# Patient Record
Sex: FEMALE | Race: WHITE | Employment: OTHER | URBAN - METROPOLITAN AREA
[De-identification: names, ages, dates, MRNs, and addresses within clinical notes are randomized per-mention and may not be internally consistent; named-entity substitution may affect disease eponyms.]

---

## 2017-03-22 ENCOUNTER — HOSPITAL ENCOUNTER (OUTPATIENT)
Facility: HOSPITAL | Age: 77
Setting detail: OBSERVATION
Discharge: HOME | End: 2017-03-23
Attending: EMERGENCY MEDICINE | Admitting: HOSPITALIST
Payer: MEDICARE

## 2017-03-22 ENCOUNTER — APPOINTMENT (OUTPATIENT)
Dept: GENERAL RADIOLOGY | Facility: HOSPITAL | Age: 77
End: 2017-03-22
Attending: EMERGENCY MEDICINE
Payer: MEDICARE

## 2017-03-22 DIAGNOSIS — R07.9 ACUTE CHEST PAIN: Primary | ICD-10-CM

## 2017-03-22 LAB
ANION GAP SERPL CALC-SCNC: 10 MMOL/L (ref 0–18)
BASOPHILS # BLD: 0.1 K/UL (ref 0–0.2)
BASOPHILS NFR BLD: 1 %
BNP SERPL-MCNC: 180 PG/ML (ref 0–100)
BUN SERPL-MCNC: 18 MG/DL (ref 8–20)
BUN/CREAT SERPL: 21.4 (ref 10–20)
CALCIUM SERPL-MCNC: 8.9 MG/DL (ref 8.5–10.5)
CHLORIDE SERPL-SCNC: 103 MMOL/L (ref 95–110)
CO2 SERPL-SCNC: 28 MMOL/L (ref 22–32)
CREAT SERPL-MCNC: 0.84 MG/DL (ref 0.5–1.5)
D DIMER PPP FEU-MCNC: 0.29 MCG/ML (ref ?–0.76)
EOSINOPHIL # BLD: 0.1 K/UL (ref 0–0.7)
EOSINOPHIL NFR BLD: 3 %
ERYTHROCYTE [DISTWIDTH] IN BLOOD BY AUTOMATED COUNT: 13.3 % (ref 11–15)
GLUCOSE SERPL-MCNC: 116 MG/DL (ref 70–99)
HCT VFR BLD AUTO: 36.7 % (ref 35–48)
HGB BLD-MCNC: 12.4 G/DL (ref 12–16)
INR BLD: 1.7 (ref 0.9–1.2)
LYMPHOCYTES # BLD: 1.4 K/UL (ref 1–4)
LYMPHOCYTES NFR BLD: 34 %
MCH RBC QN AUTO: 30.3 PG (ref 27–32)
MCHC RBC AUTO-ENTMCNC: 33.7 G/DL (ref 32–37)
MCV RBC AUTO: 89.9 FL (ref 80–100)
MONOCYTES # BLD: 0.6 K/UL (ref 0–1)
MONOCYTES NFR BLD: 14 %
NEUTROPHILS # BLD AUTO: 2 K/UL (ref 1.8–7.7)
NEUTROPHILS NFR BLD: 47 %
OSMOLALITY UR CALC.SUM OF ELEC: 295 MOSM/KG (ref 275–295)
PLATELET # BLD AUTO: 210 K/UL (ref 140–400)
PMV BLD AUTO: 7.7 FL (ref 7.4–10.3)
POTASSIUM SERPL-SCNC: 3.8 MMOL/L (ref 3.3–5.1)
PROTHROMBIN TIME: 19.3 SECONDS (ref 11.8–14.5)
RBC # BLD AUTO: 4.08 M/UL (ref 3.7–5.4)
SODIUM SERPL-SCNC: 141 MMOL/L (ref 136–144)
TROPONIN I SERPL-MCNC: 0.01 NG/ML (ref ?–0.03)
TROPONIN I SERPL-MCNC: 0.02 NG/ML (ref ?–0.03)
TROPONIN I SERPL-MCNC: 0.03 NG/ML (ref ?–0.03)
WBC # BLD AUTO: 4.2 K/UL (ref 4–11)

## 2017-03-22 PROCEDURE — 99220 INITIAL OBSERVATION CARE,LEVL III: CPT | Performed by: HOSPITALIST

## 2017-03-22 PROCEDURE — 71020 XR CHEST PA + LAT CHEST (CPT=71020): CPT

## 2017-03-22 RX ORDER — WARFARIN SODIUM 5 MG/1
5 TABLET ORAL NIGHTLY
Status: DISCONTINUED | OUTPATIENT
Start: 2017-03-22 | End: 2017-03-23

## 2017-03-22 RX ORDER — ATENOLOL 50 MG/1
50 TABLET ORAL DAILY
Status: ON HOLD | COMMUNITY
Start: 2017-03-23 | End: 2018-07-31

## 2017-03-22 RX ORDER — CARBAMAZEPINE 200 MG/1
100 TABLET ORAL 2 TIMES DAILY
COMMUNITY

## 2017-03-22 RX ORDER — LORAZEPAM 1 MG/1
1 TABLET ORAL 2 TIMES DAILY
Status: ON HOLD | COMMUNITY
End: 2018-08-03

## 2017-03-22 RX ORDER — RANITIDINE 15 MG/ML
150 SOLUTION ORAL DAILY
Status: DISCONTINUED | OUTPATIENT
Start: 2017-03-22 | End: 2017-03-23

## 2017-03-22 RX ORDER — SIMVASTATIN 5 MG
40 TABLET ORAL NIGHTLY
Status: ON HOLD | COMMUNITY
End: 2018-07-31

## 2017-03-22 RX ORDER — POTASSIUM CHLORIDE 20 MEQ/1
40 TABLET, EXTENDED RELEASE ORAL ONCE
Status: COMPLETED | OUTPATIENT
Start: 2017-03-22 | End: 2017-03-22

## 2017-03-22 RX ORDER — LEVOTHYROXINE SODIUM 88 UG/1
88 TABLET ORAL
COMMUNITY
Start: 2017-03-23 | End: 2020-01-03

## 2017-03-22 RX ORDER — MECLIZINE HYDROCHLORIDE 25 MG/1
25 TABLET ORAL 2 TIMES DAILY PRN
Status: DISCONTINUED | OUTPATIENT
Start: 2017-03-22 | End: 2017-03-23

## 2017-03-22 RX ORDER — FUROSEMIDE 40 MG/1
40 TABLET ORAL 2 TIMES DAILY
Status: DISCONTINUED | OUTPATIENT
Start: 2017-03-22 | End: 2017-03-22

## 2017-03-22 RX ORDER — NYSTATIN 100000 U/G
OINTMENT TOPICAL 2 TIMES DAILY
Status: DISCONTINUED | OUTPATIENT
Start: 2017-03-22 | End: 2017-03-23

## 2017-03-22 RX ORDER — MONTELUKAST SODIUM 10 MG/1
10 TABLET ORAL NIGHTLY
COMMUNITY

## 2017-03-22 RX ORDER — MECLIZINE HYDROCHLORIDE 25 MG/1
25 TABLET ORAL 2 TIMES DAILY PRN
COMMUNITY
End: 2019-09-24

## 2017-03-22 RX ORDER — PRAVASTATIN SODIUM 10 MG
10 TABLET ORAL NIGHTLY
Status: DISCONTINUED | OUTPATIENT
Start: 2017-03-22 | End: 2017-03-23

## 2017-03-22 RX ORDER — FUROSEMIDE 40 MG/1
40 TABLET ORAL 2 TIMES DAILY
COMMUNITY
Start: 2017-03-22

## 2017-03-22 RX ORDER — FUROSEMIDE 10 MG/ML
20 INJECTION INTRAMUSCULAR; INTRAVENOUS ONCE
Status: COMPLETED | OUTPATIENT
Start: 2017-03-22 | End: 2017-03-22

## 2017-03-22 RX ORDER — CARBAMAZEPINE 200 MG/1
200 TABLET ORAL 2 TIMES DAILY
Status: DISCONTINUED | OUTPATIENT
Start: 2017-03-22 | End: 2017-03-23

## 2017-03-22 RX ORDER — NITROGLYCERIN 0.4 MG/1
0.4 TABLET SUBLINGUAL EVERY 5 MIN PRN
Status: DISCONTINUED | OUTPATIENT
Start: 2017-03-22 | End: 2017-03-23

## 2017-03-22 RX ORDER — ATENOLOL 50 MG/1
50 TABLET ORAL DAILY
Status: DISCONTINUED | OUTPATIENT
Start: 2017-03-22 | End: 2017-03-23

## 2017-03-22 RX ORDER — MONTELUKAST SODIUM 10 MG/1
10 TABLET ORAL NIGHTLY
Status: DISCONTINUED | OUTPATIENT
Start: 2017-03-22 | End: 2017-03-23

## 2017-03-22 RX ORDER — GABAPENTIN 300 MG/1
300 CAPSULE ORAL 3 TIMES DAILY
COMMUNITY

## 2017-03-22 RX ORDER — PHENYTOIN SODIUM 200 MG/1
200 CAPSULE, EXTENDED RELEASE ORAL 2 TIMES DAILY
Status: ON HOLD | COMMUNITY
End: 2018-08-03

## 2017-03-22 RX ORDER — RANITIDINE 15 MG/ML
150 SOLUTION ORAL 2 TIMES DAILY
Status: ON HOLD | COMMUNITY
End: 2018-08-03

## 2017-03-22 RX ORDER — SPIRONOLACTONE 25 MG/1
25 TABLET ORAL DAILY
COMMUNITY
Start: 2017-03-23

## 2017-03-22 RX ORDER — LEVOTHYROXINE SODIUM 88 UG/1
88 TABLET ORAL
Status: DISCONTINUED | OUTPATIENT
Start: 2017-03-22 | End: 2017-03-23

## 2017-03-22 RX ORDER — GABAPENTIN 300 MG/1
300 CAPSULE ORAL 3 TIMES DAILY
Status: DISCONTINUED | OUTPATIENT
Start: 2017-03-22 | End: 2017-03-23

## 2017-03-22 RX ORDER — WARFARIN SODIUM 5 MG/1
5 TABLET ORAL NIGHTLY
Status: ON HOLD | COMMUNITY
End: 2018-08-03

## 2017-03-22 RX ORDER — PHENYTOIN SODIUM 100 MG/1
400 CAPSULE, EXTENDED RELEASE ORAL 2 TIMES DAILY
Status: DISCONTINUED | OUTPATIENT
Start: 2017-03-22 | End: 2017-03-23

## 2017-03-22 RX ORDER — FUROSEMIDE 40 MG/1
40 TABLET ORAL
Status: DISCONTINUED | OUTPATIENT
Start: 2017-03-22 | End: 2017-03-23

## 2017-03-22 RX ORDER — SPIRONOLACTONE 25 MG/1
25 TABLET ORAL DAILY
Status: DISCONTINUED | OUTPATIENT
Start: 2017-03-22 | End: 2017-03-23

## 2017-03-22 RX ORDER — LORAZEPAM 1 MG/1
1 TABLET ORAL 2 TIMES DAILY
Status: DISCONTINUED | OUTPATIENT
Start: 2017-03-22 | End: 2017-03-23

## 2017-03-22 NOTE — ED PROVIDER NOTES
Patient Seen in: [facility] AND [facility] Emergency Department    History   Patient presents with:  Chest Pain Angina (cardiovascular)    Stated Complaint:     HPI    68year old female presenting with chest pain  Location: entire anterior chest .  Quality: tight 200 MG Oral Tab,  Take 200 mg by mouth 2 (two) times daily. No family history on file. Smoking Status: Never Smoker                        Review of Systems    Positive for stated complaint:   Other systems are as noted in HPI.   Constitutional a displays no seizure activity. Skin: Skin is intact. No petechiae noted. She is not diaphoretic. No cyanosis. No pallor. Psychiatric: She has a normal mood and affect. Her behavior is normal.   Nursing note and vitals reviewed.           ED Course     Nu DIFFERENTIAL WITH PLATELET    Narrative: The following orders were created for panel order CBC WITH DIFFERENTIAL WITH PLATELET.   Procedure                               Abnormality         Status                     --------- Given 3/22/17 0998)   Perflutren Lipid Microsphere (DEFINITY) 6.52 MG/ML injection 1.5 mL (1.5 mL Intravenous Given 3/23/17 9422)   regadenoson (LEXISCAN) 0.4 MG/5ML injection ( Intravenous Given 3/23/17 1000)   Sodium Chloride 0.9 % solution ( Intravenous Discussions or Sign-Outs: Admitting physician    Impression:  After consideration of the above differential and review and interpretation of the above emergency department workup along with clinical intuition, the patient is reasonably believed to have Acu

## 2017-03-22 NOTE — ED INITIAL ASSESSMENT (HPI)
Patient from home woke up \"not feeling good\", c/o chest pressure and feeling an increase in heart rate and SOB. Pt had pacemaker placed in October for irregular heart rhythm. Denies hx of stroke, blood clots or heart attack.  Pt took 4 baby aspirin at [facility]

## 2017-03-22 NOTE — PLAN OF CARE
CARDIOVASCULAR - ADULT    • Maintains optimal cardiac output and hemodynamic stability Progressing    • Absence of cardiac arrhythmias or at baseline    ON TELE. V-PACED.  DENIES CHEST PAIN AT THIS TIME. TOMORROW SCHEDULED 2D ECHO AND STRESS TEST Progressin

## 2017-03-23 ENCOUNTER — APPOINTMENT (OUTPATIENT)
Dept: CV DIAGNOSTICS | Facility: HOSPITAL | Age: 77
End: 2017-03-23
Attending: HOSPITALIST
Payer: MEDICARE

## 2017-03-23 ENCOUNTER — APPOINTMENT (OUTPATIENT)
Dept: NUCLEAR MEDICINE | Facility: HOSPITAL | Age: 77
End: 2017-03-23
Attending: HOSPITALIST
Payer: MEDICARE

## 2017-03-23 VITALS
DIASTOLIC BLOOD PRESSURE: 65 MMHG | TEMPERATURE: 98 F | SYSTOLIC BLOOD PRESSURE: 100 MMHG | BODY MASS INDEX: 41.64 KG/M2 | HEART RATE: 69 BPM | WEIGHT: 235 LBS | RESPIRATION RATE: 18 BRPM | OXYGEN SATURATION: 95 % | HEIGHT: 63 IN

## 2017-03-23 LAB
ALBUMIN SERPL BCP-MCNC: 3.1 G/DL (ref 3.5–4.8)
ALBUMIN/GLOB SERPL: 1 {RATIO} (ref 1–2)
ALP SERPL-CCNC: 71 U/L (ref 32–100)
ALT SERPL-CCNC: 17 U/L (ref 14–54)
ANION GAP SERPL CALC-SCNC: 7 MMOL/L (ref 0–18)
AST SERPL-CCNC: 23 U/L (ref 15–41)
BASOPHILS # BLD: 0 K/UL (ref 0–0.2)
BASOPHILS NFR BLD: 1 %
BILIRUB SERPL-MCNC: 0.9 MG/DL (ref 0.3–1.2)
BUN SERPL-MCNC: 20 MG/DL (ref 8–20)
BUN/CREAT SERPL: 22.2 (ref 10–20)
CALCIUM SERPL-MCNC: 8.8 MG/DL (ref 8.5–10.5)
CHLORIDE SERPL-SCNC: 105 MMOL/L (ref 95–110)
CHOLEST SERPL-MCNC: 217 MG/DL (ref 110–200)
CO2 SERPL-SCNC: 30 MMOL/L (ref 22–32)
CREAT SERPL-MCNC: 0.9 MG/DL (ref 0.5–1.5)
EOSINOPHIL # BLD: 0.1 K/UL (ref 0–0.7)
EOSINOPHIL NFR BLD: 3 %
ERYTHROCYTE [DISTWIDTH] IN BLOOD BY AUTOMATED COUNT: 13.1 % (ref 11–15)
GLOBULIN PLAS-MCNC: 3 G/DL (ref 2.5–3.7)
GLUCOSE SERPL-MCNC: 95 MG/DL (ref 70–99)
HCT VFR BLD AUTO: 36.4 % (ref 35–48)
HDLC SERPL-MCNC: 60 MG/DL
HGB BLD-MCNC: 12.2 G/DL (ref 12–16)
INR BLD: 1.7 (ref 0.9–1.2)
LDLC SERPL CALC-MCNC: 138 MG/DL (ref 0–99)
LYMPHOCYTES # BLD: 2 K/UL (ref 1–4)
LYMPHOCYTES NFR BLD: 44 %
MAGNESIUM SERPL-MCNC: 2.1 MG/DL (ref 1.8–2.5)
MCH RBC QN AUTO: 30.3 PG (ref 27–32)
MCHC RBC AUTO-ENTMCNC: 33.6 G/DL (ref 32–37)
MCV RBC AUTO: 90.2 FL (ref 80–100)
MONOCYTES # BLD: 0.6 K/UL (ref 0–1)
MONOCYTES NFR BLD: 12 %
NEUTROPHILS # BLD AUTO: 1.8 K/UL (ref 1.8–7.7)
NEUTROPHILS NFR BLD: 40 %
NONHDLC SERPL-MCNC: 157 MG/DL
OSMOLALITY UR CALC.SUM OF ELEC: 296 MOSM/KG (ref 275–295)
PLATELET # BLD AUTO: 206 K/UL (ref 140–400)
PMV BLD AUTO: 7.7 FL (ref 7.4–10.3)
POTASSIUM SERPL-SCNC: 4.2 MMOL/L (ref 3.3–5.1)
POTASSIUM SERPL-SCNC: 4.2 MMOL/L (ref 3.3–5.1)
PROT SERPL-MCNC: 6.1 G/DL (ref 5.9–8.4)
PROTHROMBIN TIME: 19.7 SECONDS (ref 11.8–14.5)
RBC # BLD AUTO: 4.03 M/UL (ref 3.7–5.4)
SODIUM SERPL-SCNC: 142 MMOL/L (ref 136–144)
TRIGL SERPL-MCNC: 93 MG/DL (ref 1–149)
TSH SERPL-ACNC: 1.22 UIU/ML (ref 0.34–5.6)
WBC # BLD AUTO: 4.6 K/UL (ref 4–11)

## 2017-03-23 PROCEDURE — 93306 TTE W/DOPPLER COMPLETE: CPT

## 2017-03-23 PROCEDURE — 93016 CV STRESS TEST SUPVJ ONLY: CPT | Performed by: RADIOLOGY

## 2017-03-23 PROCEDURE — 78452 HT MUSCLE IMAGE SPECT MULT: CPT

## 2017-03-23 PROCEDURE — 93018 CV STRESS TEST I&R ONLY: CPT | Performed by: RADIOLOGY

## 2017-03-23 PROCEDURE — 99217 OBSERVATION CARE DISCHARGE: CPT | Performed by: HOSPITALIST

## 2017-03-23 PROCEDURE — 93306 TTE W/DOPPLER COMPLETE: CPT | Performed by: INTERNAL MEDICINE

## 2017-03-23 PROCEDURE — 93017 CV STRESS TEST TRACING ONLY: CPT

## 2017-03-23 RX ORDER — 0.9 % SODIUM CHLORIDE 0.9 %
VIAL (ML) INJECTION
Status: COMPLETED
Start: 2017-03-23 | End: 2017-03-23

## 2017-03-23 NOTE — DISCHARGE SUMMARY
[facility]    Discharge Summary    Dusty Mancini Patient Status:  Observation    10/13/1940 MRN [id number]   Location [facility]5W Attending Samson Hendrickson MD   Hosp Day # 1 PCP Radha Marinelli     Date of Admission: 3/22/2017 coumadin  Full code       Complications: none     Consultants     Provider Role    Daniella Echevarria MD Consulting Physician           Pending Labs     Order Current Status    RAINBOW DRAW 1711 Donita Curtis (03/22/17 7975)          Discharge Plan:   Disc by mouth 2 (two) times daily. Refills:  0       gabapentin 300 MG Caps   Last time this was given:  300 mg on 3/23/2017 11:33 AM   Commonly known as:  NEURONTIN        Take 300 mg by mouth 3 (three) times daily.     Refills:  0       Levothyroxine Sodium [place] [id number]  [phone]            Follow up Labs and imaging: none         Time spent:  > 30 minutes    JOHN FISH  3/23/2017

## 2017-03-23 NOTE — PLAN OF CARE
Maintains optimal cardiac output and hemodynamic stability Progressing      Absence of cardiac arrhythmias or at baseline Progressing    CELESTINE FOR 2DECHO AND LEXISCAN.  IF RESULTS NORMAL PLAN FOR DISCHARGE HOME  Patient/Family Long Term Goal Progressing    TO

## 2018-07-31 ENCOUNTER — APPOINTMENT (OUTPATIENT)
Dept: CT IMAGING | Facility: HOSPITAL | Age: 78
DRG: 092 | End: 2018-07-31
Attending: EMERGENCY MEDICINE
Payer: MEDICARE

## 2018-07-31 ENCOUNTER — APPOINTMENT (OUTPATIENT)
Dept: CT IMAGING | Facility: HOSPITAL | Age: 78
DRG: 092 | End: 2018-07-31
Payer: MEDICARE

## 2018-07-31 ENCOUNTER — APPOINTMENT (OUTPATIENT)
Dept: MRI IMAGING | Facility: HOSPITAL | Age: 78
DRG: 092 | End: 2018-07-31
Attending: HOSPITALIST
Payer: MEDICARE

## 2018-07-31 ENCOUNTER — HOSPITAL ENCOUNTER (INPATIENT)
Facility: HOSPITAL | Age: 78
LOS: 3 days | Discharge: NURSING, REHAB OR LONG-TERM CARE | DRG: 092 | End: 2018-08-03
Attending: EMERGENCY MEDICINE | Admitting: HOSPITALIST
Payer: MEDICARE

## 2018-07-31 ENCOUNTER — APPOINTMENT (OUTPATIENT)
Dept: GENERAL RADIOLOGY | Facility: HOSPITAL | Age: 78
DRG: 092 | End: 2018-07-31
Payer: MEDICARE

## 2018-07-31 DIAGNOSIS — R40.4 TRANSIENT ALTERATION OF AWARENESS: ICD-10-CM

## 2018-07-31 DIAGNOSIS — R41.82 ALTERED MENTAL STATUS, UNSPECIFIED ALTERED MENTAL STATUS TYPE: Primary | ICD-10-CM

## 2018-07-31 DIAGNOSIS — R40.0 SOMNOLENCE: ICD-10-CM

## 2018-07-31 LAB
ANION GAP SERPL CALC-SCNC: 9 MMOL/L (ref 0–18)
APTT PPP: 38.1 SECONDS (ref 23.2–35.3)
BASE EXCESS BLD CALC-SCNC: 6.1 MMOL/L (ref ?–2)
BASOPHILS # BLD: 0 K/UL (ref 0–0.2)
BASOPHILS NFR BLD: 1 %
BILIRUB UR QL: NEGATIVE
BUN SERPL-MCNC: 10 MG/DL (ref 8–20)
BUN/CREAT SERPL: 11.8 (ref 10–20)
CALCIUM SERPL-MCNC: 8.6 MG/DL (ref 8.5–10.5)
CARBAMAZEPINE SERPL-MCNC: 8.2 MCG/ML (ref 4–12)
CHLORIDE SERPL-SCNC: 99 MMOL/L (ref 95–110)
CO2 SERPL-SCNC: 29 MMOL/L (ref 22–32)
COLOR UR: YELLOW
CREAT SERPL-MCNC: 0.85 MG/DL (ref 0.5–1.5)
DIGOXIN SERPL-MCNC: 0.8 NG/ML (ref 0.8–2.1)
EOSINOPHIL # BLD: 0.1 K/UL (ref 0–0.7)
EOSINOPHIL NFR BLD: 3 %
ERYTHROCYTE [DISTWIDTH] IN BLOOD BY AUTOMATED COUNT: 13.4 % (ref 11–15)
GLUCOSE BLDC GLUCOMTR-MCNC: 117 MG/DL (ref 70–99)
GLUCOSE SERPL-MCNC: 118 MG/DL (ref 70–99)
GLUCOSE UR-MCNC: NEGATIVE MG/DL
HCO3 BLDA-SCNC: 31.7 MEQ/L (ref 21–27)
HCT VFR BLD AUTO: 38.8 % (ref 35–48)
HGB BLD-MCNC: 12.8 G/DL (ref 12–16)
HGB UR QL STRIP.AUTO: NEGATIVE
INR BLD: 3.4 (ref 0.9–1.2)
KETONES UR-MCNC: NEGATIVE MG/DL
LEUKOCYTE ESTERASE UR QL STRIP.AUTO: NEGATIVE
LYMPHOCYTES # BLD: 1.5 K/UL (ref 1–4)
LYMPHOCYTES NFR BLD: 33 %
MCH RBC QN AUTO: 30.7 PG (ref 27–32)
MCHC RBC AUTO-ENTMCNC: 33 G/DL (ref 32–37)
MCV RBC AUTO: 92.8 FL (ref 80–100)
MODIFIED ALLEN TEST: POSITIVE
MONOCYTES # BLD: 0.7 K/UL (ref 0–1)
MONOCYTES NFR BLD: 15 %
NEUTROPHILS # BLD AUTO: 2.2 K/UL (ref 1.8–7.7)
NEUTROPHILS NFR BLD: 48 %
NITRITE UR QL STRIP.AUTO: NEGATIVE
O2 CT BLD-SCNC: 15.9 VOL% (ref 15–23)
OSMOLALITY UR CALC.SUM OF ELEC: 284 MOSM/KG (ref 275–295)
OXYGEN LITERS/MINUTE: 2 L/MIN
PCO2 BLDA: 52 MM HG (ref 35–45)
PH BLDA: 7.4 [PH] (ref 7.35–7.45)
PH UR: 6 [PH] (ref 5–8)
PHENYTOIN SERPL-MCNC: 19.4 MCG/ML (ref 10–20)
PLATELET # BLD AUTO: 213 K/UL (ref 140–400)
PMV BLD AUTO: 8.1 FL (ref 7.4–10.3)
PO2 BLDA: 67 MM HG (ref 80–100)
PO2 SATN ADJ TO 0.5 BLD: 26 MMHG (ref 24–28)
POTASSIUM SERPL-SCNC: 3.7 MMOL/L (ref 3.3–5.1)
PROT UR-MCNC: NEGATIVE MG/DL
PROTHROMBIN TIME: 33.3 SECONDS (ref 11.8–14.5)
PUNCTURE CHARGE: YES
RBC # BLD AUTO: 4.18 M/UL (ref 3.7–5.4)
SAO2 % BLDA: 93.5 % (ref 94–100)
SODIUM SERPL-SCNC: 137 MMOL/L (ref 136–144)
SP GR UR STRIP: 1.01 (ref 1–1.03)
TROPONIN I SERPL-MCNC: 0.01 NG/ML (ref ?–0.03)
UROBILINOGEN UR STRIP-ACNC: <2
VIT C UR-MCNC: NEGATIVE MG/DL
WBC # BLD AUTO: 4.5 K/UL (ref 4–11)

## 2018-07-31 PROCEDURE — 70496 CT ANGIOGRAPHY HEAD: CPT | Performed by: EMERGENCY MEDICINE

## 2018-07-31 PROCEDURE — 70450 CT HEAD/BRAIN W/O DYE: CPT | Performed by: EMERGENCY MEDICINE

## 2018-07-31 PROCEDURE — 99223 1ST HOSP IP/OBS HIGH 75: CPT | Performed by: HOSPITALIST

## 2018-07-31 PROCEDURE — 70498 CT ANGIOGRAPHY NECK: CPT | Performed by: EMERGENCY MEDICINE

## 2018-07-31 PROCEDURE — 71045 X-RAY EXAM CHEST 1 VIEW: CPT | Performed by: EMERGENCY MEDICINE

## 2018-07-31 RX ORDER — ACETAMINOPHEN 325 MG/1
650 TABLET ORAL EVERY 6 HOURS PRN
Status: DISCONTINUED | OUTPATIENT
Start: 2018-07-31 | End: 2018-08-03

## 2018-07-31 RX ORDER — DIGOXIN 125 MCG
125 TABLET ORAL DAILY
Status: DISCONTINUED | OUTPATIENT
Start: 2018-08-01 | End: 2018-08-03

## 2018-07-31 RX ORDER — SOTALOL HYDROCHLORIDE 80 MG/1
80 TABLET ORAL DAILY
Status: DISCONTINUED | OUTPATIENT
Start: 2018-08-01 | End: 2018-08-03

## 2018-07-31 RX ORDER — ATORVASTATIN CALCIUM 40 MG/1
40 TABLET, FILM COATED ORAL NIGHTLY
COMMUNITY

## 2018-07-31 RX ORDER — GABAPENTIN 300 MG/1
300 CAPSULE ORAL 3 TIMES DAILY
Status: DISCONTINUED | OUTPATIENT
Start: 2018-07-31 | End: 2018-08-03

## 2018-07-31 RX ORDER — SPIRONOLACTONE 25 MG/1
25 TABLET ORAL DAILY
Status: DISCONTINUED | OUTPATIENT
Start: 2018-08-01 | End: 2018-08-03

## 2018-07-31 RX ORDER — LORAZEPAM 1 MG/1
1 TABLET ORAL 2 TIMES DAILY
Status: DISCONTINUED | OUTPATIENT
Start: 2018-07-31 | End: 2018-08-03

## 2018-07-31 RX ORDER — NITROGLYCERIN 40 MG/1
1 PATCH TRANSDERMAL EVERY MORNING
Status: DISCONTINUED | OUTPATIENT
Start: 2018-08-01 | End: 2018-08-03

## 2018-07-31 RX ORDER — NITROGLYCERIN 40 MG/1
1 PATCH TRANSDERMAL DAILY
COMMUNITY
End: 2019-10-24

## 2018-07-31 RX ORDER — PHENYTOIN SODIUM 100 MG/1
200 CAPSULE, EXTENDED RELEASE ORAL 2 TIMES DAILY
Status: DISCONTINUED | OUTPATIENT
Start: 2018-07-31 | End: 2018-08-01

## 2018-07-31 RX ORDER — CARBAMAZEPINE 200 MG/1
200 TABLET ORAL 2 TIMES DAILY
Status: DISCONTINUED | OUTPATIENT
Start: 2018-07-31 | End: 2018-08-03

## 2018-07-31 RX ORDER — MONTELUKAST SODIUM 10 MG/1
10 TABLET ORAL NIGHTLY
Status: DISCONTINUED | OUTPATIENT
Start: 2018-07-31 | End: 2018-08-03

## 2018-07-31 RX ORDER — SODIUM CHLORIDE 0.9 % (FLUSH) 0.9 %
3 SYRINGE (ML) INJECTION AS NEEDED
Status: DISCONTINUED | OUTPATIENT
Start: 2018-07-31 | End: 2018-08-03

## 2018-07-31 RX ORDER — FAMOTIDINE 20 MG/1
20 TABLET ORAL 2 TIMES DAILY
Status: DISCONTINUED | OUTPATIENT
Start: 2018-07-31 | End: 2018-08-01

## 2018-07-31 RX ORDER — MECLIZINE HYDROCHLORIDE 25 MG/1
25 TABLET ORAL 2 TIMES DAILY PRN
Status: DISCONTINUED | OUTPATIENT
Start: 2018-07-31 | End: 2018-08-03

## 2018-07-31 RX ORDER — NITROGLYCERIN 40 MG/1
1 PATCH TRANSDERMAL DAILY
Status: DISCONTINUED | OUTPATIENT
Start: 2018-07-31 | End: 2018-07-31

## 2018-07-31 RX ORDER — ONDANSETRON 2 MG/ML
4 INJECTION INTRAMUSCULAR; INTRAVENOUS EVERY 6 HOURS PRN
Status: DISCONTINUED | OUTPATIENT
Start: 2018-07-31 | End: 2018-08-03

## 2018-07-31 RX ORDER — SOTALOL HYDROCHLORIDE 80 MG/1
80 TABLET ORAL DAILY
COMMUNITY

## 2018-07-31 RX ORDER — ATORVASTATIN CALCIUM 40 MG/1
40 TABLET, FILM COATED ORAL NIGHTLY
Status: DISCONTINUED | OUTPATIENT
Start: 2018-07-31 | End: 2018-08-03

## 2018-07-31 RX ORDER — DILTIAZEM HYDROCHLORIDE 120 MG/1
120 TABLET, FILM COATED ORAL DAILY
COMMUNITY

## 2018-07-31 RX ORDER — FUROSEMIDE 40 MG/1
40 TABLET ORAL 2 TIMES DAILY
Status: DISCONTINUED | OUTPATIENT
Start: 2018-07-31 | End: 2018-08-03

## 2018-07-31 RX ORDER — 0.9 % SODIUM CHLORIDE 0.9 %
VIAL (ML) INJECTION
Status: COMPLETED
Start: 2018-07-31 | End: 2018-07-31

## 2018-07-31 RX ORDER — LEVOTHYROXINE SODIUM 88 UG/1
88 TABLET ORAL
Status: DISCONTINUED | OUTPATIENT
Start: 2018-08-01 | End: 2018-08-03

## 2018-07-31 RX ORDER — ASPIRIN 325 MG
325 TABLET, DELAYED RELEASE (ENTERIC COATED) ORAL ONCE
Status: COMPLETED | OUTPATIENT
Start: 2018-07-31 | End: 2018-07-31

## 2018-07-31 RX ORDER — DIGOXIN 125 MCG
TABLET ORAL DAILY
COMMUNITY

## 2018-07-31 NOTE — RESPIRATORY THERAPY NOTE
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 Pt does not routinely use a CPAP device at home. pt use oxygen during night time instead of cpap. Claustrophobic to the mask.       Pt refused: yes

## 2018-07-31 NOTE — PROGRESS NOTES
07/31/18 1421   Clinical Encounter Type   Visited With Patient and family together   Routine Visit Introduction   Continue Visiting Yes   Crisis Visit ED   Scientology Encounters   Scientology Needs Prayer   Spiritual Requests During Visit / Hospitalization

## 2018-07-31 NOTE — H&P
[facility]    PATIENT'S NAME: Darren Henson PHYSICIAN: Edin Polanco MD   PATIENT ACCOUNT#:   [id number]    LOCATION:  [place] [address]  MEDICAL RECORD #:   [id number]       YOB: 1940  ADMISSION DATE:       07/31/2 Cardizem. ALLERGIES:  Lidocaine, sulfa, and Vicodin; the patient is not able to specify reactions. FAMILY HISTORY:  Both parents  of old age. The patient is not able to provide any further details.     SOCIAL HISTORY:  No tobacco, alcohol or drug I will discontinue the Cardizem for now and if needed will increase her sotalol dose. Neurology consult; Dr. Claus Painting was notified. We will place her on fall precautions, aspiration precautions. Physical and Occupational Therapy to evaluate the patient.

## 2018-07-31 NOTE — ED INITIAL ASSESSMENT (HPI)
PATIENT VIA MEDICS FROM HOME---PATIENT APPARENTLY SLIPPED OUT OF HER CHAIR ABOUT AN HOUR AGO WITH NO INJURY. PATIENT HAS HAD SLURRED SPEECH SINCE    ON COUMADIN.

## 2018-07-31 NOTE — PROGRESS NOTES
Neurology Inpatient Note    Attempt to see pt in ED, away for testing; will re attempt.     MD TAISHA Chauhan [facility]

## 2018-07-31 NOTE — ED NOTES
Care assumed from EMS presents from home with slurred speech and generalized weakness Daughter in Law reports 1 hour hx of slurred speech and unstable gait Pt drowsy arouses to verbal stim and states \"my tongue feel fuzzy\" No extremity weakness + R eye p

## 2018-07-31 NOTE — PROGRESS NOTES
Ranitidine is a non-formulary medication and will be therapeutically interchanged to Famotidine per P&T protocol

## 2018-07-31 NOTE — ED PROVIDER NOTES
Patient Seen in: [facility] Emergency Department    History   Patient presents with:  Stroke    Stated Complaint: STROKE    HPI    71-year-old female who normally follows reportedly at [facility] per EMS and the patient was at home getting intact and equal distal pulses. Pulmonary/Chest: Effort normal. No respiratory distress. Clear and equal breath sounds bilaterally. Abdominal: Soft. There is no tenderness. There is no guarding. Musculoskeletal: Normal range of motion.  No edema or t (TEGRETOL) TOTAL - Normal   CBC WITH DIFFERENTIAL WITH PLATELET    Narrative: The following orders were created for panel order CBC WITH DIFFERENTIAL WITH PLATELET.   Procedure                               Abnormality         Status FINDINGS:  CSF SPACES: The ventricles, cisterns, and sulci are dilated, but remain commensurate in caliber, consistent with parenchymal volume loss of a degree that is appropriate for age.  No hydrocephalus, subarachnoid hemorrhage, or effacement of the bas (CST): Mohini Mendez MD on 7/31/2018 at 12:36     Approved by (CST): Mohini Mendez MD on 7/31/2018 at 12:38          Ct Stroke Cta Brain/cta Neck (w Iv)(cpt=70496/66245)    Result Date: 7/31/2018  PROCEDURE: CT STROKE CTA BRAIN/CTA NECK (WIV) (CPT=7049 spine.  INTRACRANIAL ARTERIES: ANTERIOR CEREBRALS:  No significant stenosis. No visible aneurysm or vascular malformation. MIDDLE CEREBRALS: No significant stenosis. No visible aneurysm or vascular malformation.  POSTERIOR CEREBRALS: No significant steno nighttime. Spoke with Dr. Gareth Long who recommended a CTA. The patient will be admitted under the [facility] hospitalist to telemetry. Per daughter she does have an MRI compatible pacemaker device.   This will need to be confirmed through [employer]

## 2018-07-31 NOTE — PROGRESS NOTES
Patient has [address] pacemaker and company states device is  approved for 1.5 and 3 ranjeet MRI and system does need to be programmed to MRI safe mode, requiring representative from company or cardiologist to switch into this mode.  Representative is

## 2018-08-01 ENCOUNTER — APPOINTMENT (OUTPATIENT)
Dept: MRI IMAGING | Facility: HOSPITAL | Age: 78
DRG: 092 | End: 2018-08-01
Attending: HOSPITALIST
Payer: MEDICARE

## 2018-08-01 LAB
AMMONIA PLAS-MCNC: 98 UG/DL (ref 19.5–64.6)
ANION GAP SERPL CALC-SCNC: 10 MMOL/L (ref 0–18)
BASOPHILS # BLD: 0.1 K/UL (ref 0–0.2)
BASOPHILS NFR BLD: 1 %
BUN SERPL-MCNC: 8 MG/DL (ref 8–20)
BUN/CREAT SERPL: 9.4 (ref 10–20)
CALCIUM SERPL-MCNC: 8.8 MG/DL (ref 8.5–10.5)
CHLORIDE SERPL-SCNC: 95 MMOL/L (ref 95–110)
CO2 SERPL-SCNC: 31 MMOL/L (ref 22–32)
CREAT SERPL-MCNC: 0.85 MG/DL (ref 0.5–1.5)
EOSINOPHIL # BLD: 0.1 K/UL (ref 0–0.7)
EOSINOPHIL NFR BLD: 2 %
ERYTHROCYTE [DISTWIDTH] IN BLOOD BY AUTOMATED COUNT: 13.5 % (ref 11–15)
GLUCOSE SERPL-MCNC: 95 MG/DL (ref 70–99)
HCT VFR BLD AUTO: 37.1 % (ref 35–48)
HGB BLD-MCNC: 12.3 G/DL (ref 12–16)
INR BLD: 3 (ref 0.9–1.2)
LYMPHOCYTES # BLD: 1.5 K/UL (ref 1–4)
LYMPHOCYTES NFR BLD: 32 %
MCH RBC QN AUTO: 30.3 PG (ref 27–32)
MCHC RBC AUTO-ENTMCNC: 33.2 G/DL (ref 32–37)
MCV RBC AUTO: 91.4 FL (ref 80–100)
MONOCYTES # BLD: 0.7 K/UL (ref 0–1)
MONOCYTES NFR BLD: 15 %
NEUTROPHILS # BLD AUTO: 2.4 K/UL (ref 1.8–7.7)
NEUTROPHILS NFR BLD: 50 %
OSMOLALITY UR CALC.SUM OF ELEC: 280 MOSM/KG (ref 275–295)
PLATELET # BLD AUTO: 221 K/UL (ref 140–400)
PMV BLD AUTO: 8.2 FL (ref 7.4–10.3)
POTASSIUM SERPL-SCNC: 3.5 MMOL/L (ref 3.3–5.1)
POTASSIUM SERPL-SCNC: 4.6 MMOL/L (ref 3.3–5.1)
PROTHROMBIN TIME: 30.1 SECONDS (ref 11.8–14.5)
RBC # BLD AUTO: 4.06 M/UL (ref 3.7–5.4)
SODIUM SERPL-SCNC: 136 MMOL/L (ref 136–144)
WBC # BLD AUTO: 4.7 K/UL (ref 4–11)

## 2018-08-01 PROCEDURE — 99223 1ST HOSP IP/OBS HIGH 75: CPT | Performed by: OTHER

## 2018-08-01 PROCEDURE — 99233 SBSQ HOSP IP/OBS HIGH 50: CPT | Performed by: HOSPITALIST

## 2018-08-01 RX ORDER — POTASSIUM CHLORIDE 20 MEQ/1
40 TABLET, EXTENDED RELEASE ORAL EVERY 4 HOURS
Status: COMPLETED | OUTPATIENT
Start: 2018-08-01 | End: 2018-08-01

## 2018-08-01 RX ORDER — PHENYTOIN SODIUM 100 MG/1
200 CAPSULE, EXTENDED RELEASE ORAL EVERY EVENING
Status: DISCONTINUED | OUTPATIENT
Start: 2018-08-01 | End: 2018-08-03

## 2018-08-01 RX ORDER — FAMOTIDINE 20 MG/1
20 TABLET ORAL DAILY
Status: DISCONTINUED | OUTPATIENT
Start: 2018-08-02 | End: 2018-08-03

## 2018-08-01 RX ORDER — PHENYTOIN SODIUM 100 MG/1
100 CAPSULE, EXTENDED RELEASE ORAL EVERY MORNING
Status: DISCONTINUED | OUTPATIENT
Start: 2018-08-01 | End: 2018-08-03

## 2018-08-01 NOTE — PROGRESS NOTES
[facility]    Progress Note    Stefania Maldonado Patient Status:  Inpatient    10/13/1940 MRN [id number]   Location [facility] 3W/SW Attending Belén Rendon MD   Hosp Day # 1 PCP Medina Geronimo       Subjective:    MICHAELA TAN mg Oral BID   • digoxin  125 mcg Oral Daily   • furosemide  40 mg Oral BID   • gabapentin  300 mg Oral TID   • Levothyroxine Sodium  88 mcg Oral Before breakfast   • LORazepam  1 mg Oral BID   • Montelukast Sodium  10 mg Oral Nightly   • Sotalol HCl  80 mg Date: 7/31/2018  CONCLUSION:  1. No acute intracranial hemorrhage or evidence of extended large vessel infarction. 2. Senescent changes of parenchymal volume loss with sequela of chronic microvascular ischemic disease.  There is also large vessel atheroscl Results  Component Value Date   INR 3.0 (H) 08/01/2018   INR 3.4 (H) 07/31/2018   INR 1.7 (H) 03/23/2017             Greater than 35 minutes spent, >50% spent counseling re: treatment plan and workup  Sallie Estrella MD      **Certification      PHYSICIA

## 2018-08-01 NOTE — OCCUPATIONAL THERAPY NOTE
OCCUPATIONAL THERAPY EVALUATION - INPATIENT     Room Number: 192/469-S  Evaluation Date: 8/1/2018  Type of Evaluation: Initial  Presenting Problem: from home, slipped out of chair; slurred speech weakness    Physician Order: IP Consult to Occupational Ther baseline and would benefit from skilled inpatient OT to address the above deficits, maximizing patient's ability to return to prior level of function.  Pt does not demonstrate the physical or cognitive skills to return to previous living environment and wou OCCUPATIONAL THERAPY EXAMINATION      OBJECTIVE  Precautions: Bed/chair alarm  Fall Risk: High fall risk    PAIN ASSESSMENT  Ratin          ACTIVITY TOLERANCE  O2 Saturation: 93%  O2 Device: Nasal cannula  Liters of O2:  2  Heart Rate: 63       COG Sit : Other (Comment) (SBA)  Sit to Stand:  Other (Comment) (CGA)  Toilet Transfer: Min A  Chair Transfer: Min A  Bed mobility: SBA  Bedroom Mobility: Min A    BALANCE ASSESSMENT  Static Sitting: SBA  Dynamic Sitting: SBA  Static Standing: Min A   Dynamic S

## 2018-08-01 NOTE — PHYSICAL THERAPY NOTE
PHYSICAL THERAPY EVALUATION - INPATIENT     Room Number: 715/668-W  Evaluation Date: 8/1/2018  Type of Evaluation: Initial   Physician Order: PT Eval and Treat    Presenting Problem: AMS  Reason for Therapy: Mobility Dysfunction and Discharge Planning in a supervised setting;Sub-acute rehabilitation    PLAN  PT Treatment Plan: Bed mobility; Body mechanics; Patient education;Gait training;Stair training;Transfer training;Balance training;Strengthening  Rehab Potential : Good  Frequency (Obs): 5x/week Nasal cannula  Liters of O2:  2  Heart Rate: 63    AM-PAC '6-Clicks' INPATIENT SHORT FORM - BASIC MOBILITY  How much difficulty does the patient currently have. ..  -   Turning over in bed (including adjusting bedclothes, sheets and blankets)?: None   -   S supervision   Goal #3   Current Status    Goal #4 Patient will negotiate 6 stairs/one curb w/ assistive device and supervision   Goal #4   Current Status    Goal #5 Patient to demonstrate independence with home activity/exercise instructions provided to pa

## 2018-08-01 NOTE — PLAN OF CARE
Problem: Patient Centered Care  Goal: Patient preferences are identified and integrated in the patient's plan of care  Interventions:  - What would you like us to know as we care for you?  \"my daughter in law Titus Patel knows all my medication\"  - Provide time Encourage visually impaired, hearing impaired and aphasic patients to use assistive/communication devices  Outcome: Progressing

## 2018-08-01 NOTE — CONSULTS
Neurology Inpatient Consult Note    David Forte : 10/13/1940   Referring Physician: Dr. Eliane Lainez  HPI:     David Forte is a 68year old female who is being seen in neurologic evaluation.     Patient being seen in evaluation for fall, slurre no dysuria, no frequency  MUSCULOSKELETAL: see HPI  PSYCH: no depression, no anxiety  NEURO: see HPI    EXAM:     Vitals:  /48 (BP Location: Right arm)   Pulse 62   Temp 98.2 °F (36.8 °C) (Oral)   Resp 18   Ht 157.5 cm (5' 2\")   Wt 212 lb 12.8 oz (9 Conclusions    1. Left ventricle: The cavity size was normal. Wall thickness was normal.     Systolic function was mildly reduced. The estimated ejection fraction was     45-50%. Hypokinesis of the apicalseptal myocardium.  Hypokinesis of the     anterosept

## 2018-08-01 NOTE — PROGRESS NOTES
[facility] Pharmacy Note:  Renal Dose Adjustment    Jennifer Orozco has been prescribed famotidine (PEPCID) 20 mg orally every 12 hours. Estimated Creatinine Clearance: 43.8 mL/min (based on SCr of 0.85 mg/dL).     Her calculated creatinine clearance is <50 ml/

## 2018-08-02 LAB
ALBUMIN SERPL BCP-MCNC: 3 G/DL (ref 3.5–4.8)
ALP SERPL-CCNC: 95 U/L (ref 32–100)
ALT SERPL-CCNC: 15 U/L (ref 14–54)
AMMONIA PLAS-MCNC: 58 UG/DL (ref 19.5–64.6)
ANION GAP SERPL CALC-SCNC: 10 MMOL/L (ref 0–18)
AST SERPL-CCNC: 28 U/L (ref 15–41)
BASOPHILS # BLD: 0.1 K/UL (ref 0–0.2)
BASOPHILS NFR BLD: 1 %
BILIRUB DIRECT SERPL-MCNC: 0.1 MG/DL (ref 0–0.2)
BILIRUB SERPL-MCNC: 0.4 MG/DL (ref 0.3–1.2)
BUN SERPL-MCNC: 10 MG/DL (ref 8–20)
BUN/CREAT SERPL: 11.2 (ref 10–20)
CALCIUM SERPL-MCNC: 9 MG/DL (ref 8.5–10.5)
CHLORIDE SERPL-SCNC: 96 MMOL/L (ref 95–110)
CO2 SERPL-SCNC: 31 MMOL/L (ref 22–32)
CREAT SERPL-MCNC: 0.89 MG/DL (ref 0.5–1.5)
EOSINOPHIL # BLD: 0.1 K/UL (ref 0–0.7)
EOSINOPHIL NFR BLD: 3 %
ERYTHROCYTE [DISTWIDTH] IN BLOOD BY AUTOMATED COUNT: 13.4 % (ref 11–15)
GLUCOSE SERPL-MCNC: 97 MG/DL (ref 70–99)
HCT VFR BLD AUTO: 37 % (ref 35–48)
HGB BLD-MCNC: 12.4 G/DL (ref 12–16)
INR BLD: 2.8 (ref 0.9–1.2)
LYMPHOCYTES # BLD: 1.7 K/UL (ref 1–4)
LYMPHOCYTES NFR BLD: 33 %
MCH RBC QN AUTO: 30.5 PG (ref 27–32)
MCHC RBC AUTO-ENTMCNC: 33.6 G/DL (ref 32–37)
MCV RBC AUTO: 90.8 FL (ref 80–100)
MONOCYTES # BLD: 0.6 K/UL (ref 0–1)
MONOCYTES NFR BLD: 12 %
NEUTROPHILS # BLD AUTO: 2.7 K/UL (ref 1.8–7.7)
NEUTROPHILS NFR BLD: 52 %
OSMOLALITY UR CALC.SUM OF ELEC: 283 MOSM/KG (ref 275–295)
PLATELET # BLD AUTO: 216 K/UL (ref 140–400)
PMV BLD AUTO: 8.2 FL (ref 7.4–10.3)
POTASSIUM SERPL-SCNC: 4.1 MMOL/L (ref 3.3–5.1)
PROT SERPL-MCNC: 5.9 G/DL (ref 5.9–8.4)
PROTHROMBIN TIME: 28.8 SECONDS (ref 11.8–14.5)
RBC # BLD AUTO: 4.07 M/UL (ref 3.7–5.4)
SODIUM SERPL-SCNC: 137 MMOL/L (ref 136–144)
WBC # BLD AUTO: 5.3 K/UL (ref 4–11)

## 2018-08-02 PROCEDURE — 99231 SBSQ HOSP IP/OBS SF/LOW 25: CPT | Performed by: OTHER

## 2018-08-02 PROCEDURE — 99233 SBSQ HOSP IP/OBS HIGH 50: CPT | Performed by: HOSPITALIST

## 2018-08-02 RX ORDER — LACTULOSE 10 G/15ML
30 SOLUTION ORAL ONCE
Status: COMPLETED | OUTPATIENT
Start: 2018-08-02 | End: 2018-08-02

## 2018-08-02 NOTE — PROGRESS NOTES
[facility]    Progress Note    Stevan Higgins Patient Status:  Inpatient    10/13/1940 MRN [id number]   Location [facility] 3W/SW Attending Qian Murphy MD   Hosp Day # 2 PCP Osvaldo Garcia       Subjective:    MICHAELA TAN mcg Oral Daily   • furosemide  40 mg Oral BID   • gabapentin  300 mg Oral TID   • Levothyroxine Sodium  88 mcg Oral Before breakfast   • LORazepam  1 mg Oral BID   • Montelukast Sodium  10 mg Oral Nightly   • Sotalol HCl  80 mg Oral Daily   • spironolacton Results  Component Value Date   INR 2.8 (H) 08/02/2018   INR 3.0 (H) 08/01/2018   INR 3.4 (H) 07/31/2018       Culture:  No results found for this visit on 07/31/18. Imaging/EKG:           Assessment and Plan:   ASSESSMENT:    1.        Acute toxic encep

## 2018-08-02 NOTE — OCCUPATIONAL THERAPY NOTE
OCCUPATIONAL THERAPY TREATMENT NOTE - INPATIENT        Room Number: 931/999-I           Presenting Problem: from home, slipped out of chair; slurred speech weakness    Problem List  Principal Problem:    Altered mental status, unspecified altered mental st TOLERANCE  VSS    ACTIVITIES OF DAILY LIVING ASSESSMENT  AM-PAC ‘6-Clicks’ Inpatient Daily Activity Short Form  How much help from another person does the patient currently need…  -   Putting on and taking off regular lower body clothing?: A Little  -   Ba

## 2018-08-02 NOTE — PLAN OF CARE
MRI NOT NEEDED PER NEUROLOGY, CONTINUING NEURO CHECKS, P.T. TREATMENT TODAY, RECOMMENDING REHAB, BUT PT.  DOES NOT WANT REHAB

## 2018-08-02 NOTE — PLAN OF CARE
Problem: Patient Centered Care  Goal: Patient preferences are identified and integrated in the patient's plan of care  Interventions:  - What would you like us to know as we care for you?  \"my daughter in law Katt Valenzuela knows all my medication\"  - Provide time Encourage visually impaired, hearing impaired and aphasic patients to use assistive/communication devices   Outcome: Progressing

## 2018-08-02 NOTE — PROGRESS NOTES
Neurology Inpatient Follow-up Note      HPI:     Patient being seen in follow-up. Is feeling a little bit better. Myoclonus continues but improved.       Past Medical Hisotory:  Reviewed    Medications:  Reviewed    Allergies:    Penicillins             U MRI brain for time being, unlikely to      –Continue phenytoin at reduced dose dose, 100 mg QAM and 200 mg QPM     –Continue delirium precautions     –PT/OT    –On discharge, patient should have close follow-up with her outpatient neurolog

## 2018-08-02 NOTE — CM/SW NOTE
8/2/18  Discharge planning   Met with pt and spoke with dtr in law Angelia via phone. PT lives with dtr in law, 2 level family home is alone during the day when dtr in law is at work.   Pt reports independent with ADL's, has walker and cane at home and re

## 2018-08-03 VITALS
HEART RATE: 63 BPM | RESPIRATION RATE: 18 BRPM | TEMPERATURE: 98 F | BODY MASS INDEX: 38.9 KG/M2 | OXYGEN SATURATION: 91 % | SYSTOLIC BLOOD PRESSURE: 131 MMHG | DIASTOLIC BLOOD PRESSURE: 57 MMHG | HEIGHT: 62 IN | WEIGHT: 211.38 LBS

## 2018-08-03 LAB
ALBUMIN SERPL BCP-MCNC: 3.1 G/DL (ref 3.5–4.8)
ALBUMIN/GLOB SERPL: 1.1 {RATIO} (ref 1–2)
ALP SERPL-CCNC: 89 U/L (ref 32–100)
ALT SERPL-CCNC: 15 U/L (ref 14–54)
AMMONIA PLAS-MCNC: 38 UG/DL (ref 19.5–64.6)
ANION GAP SERPL CALC-SCNC: 9 MMOL/L (ref 0–18)
AST SERPL-CCNC: 25 U/L (ref 15–41)
BASOPHILS # BLD: 0 K/UL (ref 0–0.2)
BASOPHILS NFR BLD: 1 %
BILIRUB SERPL-MCNC: 0.4 MG/DL (ref 0.3–1.2)
BILIRUB UR QL: NEGATIVE
BUN SERPL-MCNC: 10 MG/DL (ref 8–20)
BUN/CREAT SERPL: 12.2 (ref 10–20)
CALCIUM SERPL-MCNC: 8.5 MG/DL (ref 8.5–10.5)
CHLORIDE SERPL-SCNC: 94 MMOL/L (ref 95–110)
CLARITY UR: CLEAR
CO2 SERPL-SCNC: 30 MMOL/L (ref 22–32)
COLOR UR: YELLOW
CREAT SERPL-MCNC: 0.82 MG/DL (ref 0.5–1.5)
EOSINOPHIL # BLD: 0.2 K/UL (ref 0–0.7)
EOSINOPHIL NFR BLD: 3 %
ERYTHROCYTE [DISTWIDTH] IN BLOOD BY AUTOMATED COUNT: 13.4 % (ref 11–15)
GLOBULIN PLAS-MCNC: 2.9 G/DL (ref 2.5–3.7)
GLUCOSE SERPL-MCNC: 100 MG/DL (ref 70–99)
GLUCOSE UR-MCNC: NEGATIVE MG/DL
HCT VFR BLD AUTO: 35.8 % (ref 35–48)
HGB BLD-MCNC: 12.1 G/DL (ref 12–16)
INR BLD: 2.2 (ref 0.9–1.2)
KETONES UR-MCNC: NEGATIVE MG/DL
LYMPHOCYTES # BLD: 1.3 K/UL (ref 1–4)
LYMPHOCYTES NFR BLD: 28 %
MCH RBC QN AUTO: 31 PG (ref 27–32)
MCHC RBC AUTO-ENTMCNC: 33.7 G/DL (ref 32–37)
MCV RBC AUTO: 92 FL (ref 80–100)
MONOCYTES # BLD: 0.6 K/UL (ref 0–1)
MONOCYTES NFR BLD: 13 %
NEUTROPHILS # BLD AUTO: 2.6 K/UL (ref 1.8–7.7)
NEUTROPHILS NFR BLD: 55 %
NITRITE UR QL STRIP.AUTO: NEGATIVE
OSMOLALITY UR CALC.SUM OF ELEC: 275 MOSM/KG (ref 275–295)
PH UR: 7 [PH] (ref 5–8)
PLATELET # BLD AUTO: 197 K/UL (ref 140–400)
PMV BLD AUTO: 7.6 FL (ref 7.4–10.3)
POTASSIUM SERPL-SCNC: 3.8 MMOL/L (ref 3.3–5.1)
PROT SERPL-MCNC: 6 G/DL (ref 5.9–8.4)
PROT UR-MCNC: NEGATIVE MG/DL
PROTHROMBIN TIME: 23.9 SECONDS (ref 11.8–14.5)
RBC # BLD AUTO: 3.9 M/UL (ref 3.7–5.4)
RBC #/AREA URNS AUTO: 2 /HPF
SODIUM SERPL-SCNC: 133 MMOL/L (ref 136–144)
SP GR UR STRIP: 1.01 (ref 1–1.03)
UROBILINOGEN UR STRIP-ACNC: <2
VIT C UR-MCNC: NEGATIVE MG/DL
WBC # BLD AUTO: 4.7 K/UL (ref 4–11)
WBC #/AREA URNS AUTO: 9 /HPF

## 2018-08-03 PROCEDURE — 99231 SBSQ HOSP IP/OBS SF/LOW 25: CPT | Performed by: OTHER

## 2018-08-03 PROCEDURE — 99239 HOSP IP/OBS DSCHRG MGMT >30: CPT | Performed by: HOSPITALIST

## 2018-08-03 RX ORDER — POTASSIUM CHLORIDE 20 MEQ/1
40 TABLET, EXTENDED RELEASE ORAL ONCE
Status: COMPLETED | OUTPATIENT
Start: 2018-08-03 | End: 2018-08-03

## 2018-08-03 RX ORDER — CIPROFLOXACIN 500 MG/1
500 TABLET, FILM COATED ORAL EVERY 12 HOURS SCHEDULED
Status: DISCONTINUED | OUTPATIENT
Start: 2018-08-03 | End: 2018-08-03

## 2018-08-03 RX ORDER — LORAZEPAM 1 MG/1
1 TABLET ORAL 2 TIMES DAILY
Qty: 5 TABLET | Refills: 0 | Status: ON HOLD | OUTPATIENT
Start: 2018-08-03 | End: 2022-01-09

## 2018-08-03 RX ORDER — CIPROFLOXACIN 500 MG/1
500 TABLET, FILM COATED ORAL EVERY 12 HOURS SCHEDULED
Qty: 6 TABLET | Refills: 0 | Status: SHIPPED | COMMUNITY
Start: 2018-08-03 | End: 2018-08-05

## 2018-08-03 RX ORDER — PHENYTOIN SODIUM 100 MG/1
200 CAPSULE, EXTENDED RELEASE ORAL EVERY MORNING
Refills: 0 | Status: SHIPPED | COMMUNITY
Start: 2018-08-04

## 2018-08-03 RX ORDER — WARFARIN SODIUM 4 MG/1
2 TABLET ORAL NIGHTLY
Status: SHIPPED | COMMUNITY
Start: 2018-08-03

## 2018-08-03 RX ORDER — PHENYTOIN SODIUM 200 MG/1
200 CAPSULE, EXTENDED RELEASE ORAL EVERY EVENING
Refills: 0 | Status: SHIPPED | COMMUNITY
Start: 2018-08-03

## 2018-08-03 RX ORDER — FAMOTIDINE 20 MG/1
20 TABLET ORAL 2 TIMES DAILY
Refills: 0 | Status: SHIPPED | COMMUNITY
Start: 2018-08-04

## 2018-08-03 NOTE — PROGRESS NOTES
Neurology Inpatient Follow-up Note      HPI:     Patient being seen in follow-up. Continuing to improve. Still having a little bit of myoclonic jerks.       Past Medical Hisotory:  Reviewed    Medications:  Reviewed    Allergies:    Penicillins

## 2018-08-03 NOTE — OCCUPATIONAL THERAPY NOTE
OCCUPATIONAL THERAPY TREATMENT NOTE - INPATIENT        Room Number: 296/297-R           Presenting Problem: from home, slipped out of chair; slurred speech weakness    Problem List  Principal Problem:    Altered mental status, unspecified altered mental st rehabilitation;24 hour care/supervision        PLAN  OT Treatment Plan: Balance activities; ADL training;Energy conservation/work simplification techniques; Functional transfer training; Endurance training;UE strengthening/ROM; Compensatory technique education dressing with Mod I  Comment: NT    Patient will tolerate standing for 3 minutes in prep for adls with SBA   Comment: SBA/CGA for dynamic standing      Comment:               Goals  on: 8/15/18  Frequency: 5x/wk

## 2018-08-03 NOTE — CM/SW NOTE
8/3CM-The Patient's daughter in-law is agreeable that the Patient going to [facility] and would like a private a room. This Writer informed John of the above, they have a private room and are able to accept the Patient today (8/3) at 6:30 p.m.  This Write

## 2018-08-03 NOTE — DISCHARGE SUMMARY
[facility]    Discharge Summary    Dustin Reeves Patient Status:  Inpatient    10/13/1940 MRN [id number]   Location [facility] 3W/SW Attending Mckenzie Murcia MD   Hosp Day # 3 PCP Dominique New     Date of Admission:  Body mass index is 38.52 kg/m².     3.       History of atrial fibrillation  -coumadin on hold, resume on DC but lower to 4mg , follow INR      4. DAKOTA  -non complaint with CPAP  -uses nightly O2     5.       Chronic CHF  -cont lasix     VTE P: INR 2.2 Cta Brain/cta Neck (w Iv)(cpt=70496/61692)    Result Date: 7/31/2018  CONCLUSION:  1. Mild atherosclerosis of carotid arteries. No hemodynamically significant stenosis or dissection. 2. Major intracranial arteries patent without significant stenosis.  No an as: LANOXIN      Take by mouth daily. Refills:  0     DilTIAZem HCl 120 MG Tabs  Commonly known as:  CARDIZEM      Take 120 mg by mouth daily.    Refills:  0     EPITOL 200 MG Tabs  Generic drug:  carBAMazepine      Take 200 mg by mouth 2 (two) times ed [address]  [phone]    In 2 weeks        Consultants     Provider Role Specialty    Leonardo Young MD Consulting Physician  NEUROLOGY            Other Discharge Instructions: None  >30 MIN SPENT ON THIS Danielle WILLARD  8/3/2018  12:53 PM

## 2018-08-03 NOTE — PLAN OF CARE
NEUROLOGICAL - ADULT    • Achieves stable or improved neurological status Adequate for Discharge    • Achieves maximal functionality and self care Adequate for Discharge        Patient Centered Care    • Patient preferences are identified and integrated in

## 2018-08-09 ENCOUNTER — LAB REQUISITION (OUTPATIENT)
Dept: LAB | Facility: HOSPITAL | Age: 78
End: 2018-08-09
Payer: MEDICARE

## 2018-08-09 DIAGNOSIS — G40.89 OTHER SEIZURES (HCC): ICD-10-CM

## 2018-08-09 DIAGNOSIS — I48.91 ATRIAL FIBRILLATION (HCC): ICD-10-CM

## 2018-08-09 DIAGNOSIS — I10 ESSENTIAL (PRIMARY) HYPERTENSION: ICD-10-CM

## 2018-08-09 LAB
ALBUMIN SERPL BCP-MCNC: 3.3 G/DL (ref 3.5–4.8)
ALBUMIN/GLOB SERPL: 1.3 {RATIO} (ref 1–2)
ALP SERPL-CCNC: 100 U/L (ref 32–100)
ALT SERPL-CCNC: 18 U/L (ref 14–54)
ANION GAP SERPL CALC-SCNC: 8 MMOL/L (ref 0–18)
AST SERPL-CCNC: 24 U/L (ref 15–41)
BASOPHILS # BLD: 0.1 K/UL (ref 0–0.2)
BASOPHILS NFR BLD: 1 %
BILIRUB SERPL-MCNC: 0.5 MG/DL (ref 0.3–1.2)
BUN SERPL-MCNC: 10 MG/DL (ref 8–20)
BUN/CREAT SERPL: 11.9 (ref 10–20)
CALCIUM SERPL-MCNC: 8.7 MG/DL (ref 8.5–10.5)
CHLORIDE SERPL-SCNC: 101 MMOL/L (ref 95–110)
CO2 SERPL-SCNC: 29 MMOL/L (ref 22–32)
CREAT SERPL-MCNC: 0.84 MG/DL (ref 0.5–1.5)
EOSINOPHIL # BLD: 0.1 K/UL (ref 0–0.7)
EOSINOPHIL NFR BLD: 3 %
ERYTHROCYTE [DISTWIDTH] IN BLOOD BY AUTOMATED COUNT: 13.4 % (ref 11–15)
GLOBULIN PLAS-MCNC: 2.5 G/DL (ref 2.5–3.7)
GLUCOSE SERPL-MCNC: 108 MG/DL (ref 70–99)
HCT VFR BLD AUTO: 35.9 % (ref 35–48)
HGB BLD-MCNC: 12 G/DL (ref 12–16)
LYMPHOCYTES # BLD: 1.4 K/UL (ref 1–4)
LYMPHOCYTES NFR BLD: 27 %
MCH RBC QN AUTO: 30.8 PG (ref 27–32)
MCHC RBC AUTO-ENTMCNC: 33.5 G/DL (ref 32–37)
MCV RBC AUTO: 92 FL (ref 80–100)
MONOCYTES # BLD: 0.7 K/UL (ref 0–1)
MONOCYTES NFR BLD: 13 %
NEUTROPHILS # BLD AUTO: 2.8 K/UL (ref 1.8–7.7)
NEUTROPHILS NFR BLD: 56 %
OSMOLALITY UR CALC.SUM OF ELEC: 286 MOSM/KG (ref 275–295)
PLATELET # BLD AUTO: 225 K/UL (ref 140–400)
PMV BLD AUTO: 8 FL (ref 7.4–10.3)
POTASSIUM SERPL-SCNC: 4 MMOL/L (ref 3.3–5.1)
PROT SERPL-MCNC: 5.8 G/DL (ref 5.9–8.4)
RBC # BLD AUTO: 3.9 M/UL (ref 3.7–5.4)
SODIUM SERPL-SCNC: 138 MMOL/L (ref 136–144)
WBC # BLD AUTO: 5.1 K/UL (ref 4–11)

## 2018-08-09 PROCEDURE — 80053 COMPREHEN METABOLIC PANEL: CPT | Performed by: INTERNAL MEDICINE

## 2018-08-09 PROCEDURE — 85025 COMPLETE CBC W/AUTO DIFF WBC: CPT | Performed by: INTERNAL MEDICINE

## 2020-07-01 ENCOUNTER — HOSPITAL ENCOUNTER (EMERGENCY)
Facility: HOSPITAL | Age: 80
Discharge: HOME | End: 2020-07-01
Attending: EMERGENCY MEDICINE
Payer: MEDICARE

## 2020-07-01 ENCOUNTER — APPOINTMENT (OUTPATIENT)
Dept: CT IMAGING | Facility: HOSPITAL | Age: 80
End: 2020-07-01
Payer: MEDICARE

## 2020-07-01 VITALS
DIASTOLIC BLOOD PRESSURE: 85 MMHG | HEIGHT: 62 IN | HEART RATE: 62 BPM | BODY MASS INDEX: 35.88 KG/M2 | WEIGHT: 195 LBS | SYSTOLIC BLOOD PRESSURE: 131 MMHG | OXYGEN SATURATION: 96 % | TEMPERATURE: 98 F | RESPIRATION RATE: 16 BRPM

## 2020-07-01 DIAGNOSIS — S06.0X0A CONCUSSION WITHOUT LOSS OF CONSCIOUSNESS, INITIAL ENCOUNTER: ICD-10-CM
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
## 2020-07-01 LAB
INR BLD: 2.1 (ref 0.9–1.2)
PROTHROMBIN TIME: 23.2 SECONDS (ref 11.8–14.5)

## 2020-07-01 PROCEDURE — 85610 PROTHROMBIN TIME: CPT | Performed by: EMERGENCY MEDICINE

## 2020-07-01 PROCEDURE — 99284 EMERGENCY DEPT VISIT MOD MDM: CPT

## 2020-07-01 PROCEDURE — 36415 COLL VENOUS BLD VENIPUNCTURE: CPT

## 2020-07-01 PROCEDURE — 70450 CT HEAD/BRAIN W/O DYE: CPT | Performed by: EMERGENCY MEDICINE

## 2020-07-01 RX ORDER — ACETAMINOPHEN 500 MG
1000 TABLET ORAL ONCE
Status: COMPLETED | OUTPATIENT
Start: 2020-07-01 | End: 2020-07-01

## 2020-07-01 NOTE — ED NOTES
Pt ambulated by self with walker to bathroom with slow and steady gait. Pt encouraged to change position slowly.

## 2020-07-01 NOTE — ED PROVIDER NOTES
Patient Seen in: [facility] AND [facility] Emergency Department      History   Patient presents with:  Fall    Stated Complaint: mechanical fall at home on coumadin    HPI    17-year-old female presents for evaluation after a fall.   Patient with history of verti rhythm. Heart sounds: Normal heart sounds. Pulmonary:      Effort: Pulmonary effort is normal. No respiratory distress. Breath sounds: Normal breath sounds. Abdominal:      General: Bowel sounds are normal. There is no distension.       Palpat

## 2020-10-15 ENCOUNTER — HOSPITAL ENCOUNTER (OUTPATIENT)
Age: 80
Discharge: HOME | End: 2020-10-15
Attending: EMERGENCY MEDICINE
Payer: MEDICARE

## 2020-10-15 VITALS
RESPIRATION RATE: 16 BRPM | SYSTOLIC BLOOD PRESSURE: 124 MMHG | TEMPERATURE: 98 F | HEART RATE: 62 BPM | DIASTOLIC BLOOD PRESSURE: 51 MMHG | OXYGEN SATURATION: 95 %

## 2020-10-15 DIAGNOSIS — Z20.822 ENCOUNTER FOR LABORATORY TESTING FOR COVID-19 VIRUS: Primary | ICD-10-CM

## 2020-10-15 PROCEDURE — 99213 OFFICE O/P EST LOW 20 MIN: CPT

## 2020-10-15 RX ORDER — ALENDRONATE SODIUM 10 MG/1
70 TABLET ORAL WEEKLY
COMMUNITY

## 2020-10-17 NOTE — ED PROVIDER NOTES
Patient Seen in: [facility]      History   Patient presents with:  Testing    Stated Complaint: COVID TESTING    HPI    [de-identified] yo female with possible covid exposure. She is asymptomatic, requesting testing for COVID.      Past Medical History: ED Course     Labs Reviewed   2019 NOVEL CORONAVIRUS SARS-COV-2 BY PCR([facility])                  MDM                    Disposition and Plan     Clinical Impression:  Encounter for laboratory testing for COVID-19 virus  (primary encounter diagnosis)    Devin Burch

## 2021-09-22 ENCOUNTER — HOSPITAL ENCOUNTER (EMERGENCY)
Facility: HOSPITAL | Age: 81
Discharge: HOME | End: 2021-09-22
Attending: EMERGENCY MEDICINE
Payer: MEDICARE

## 2021-09-22 ENCOUNTER — APPOINTMENT (OUTPATIENT)
Dept: CT IMAGING | Facility: HOSPITAL | Age: 81
End: 2021-09-22
Attending: EMERGENCY MEDICINE
Payer: MEDICARE

## 2021-09-22 VITALS
SYSTOLIC BLOOD PRESSURE: 121 MMHG | DIASTOLIC BLOOD PRESSURE: 77 MMHG | HEIGHT: 62 IN | RESPIRATION RATE: 18 BRPM | TEMPERATURE: 97 F | HEART RATE: 62 BPM | OXYGEN SATURATION: 94 % | WEIGHT: 187 LBS | BODY MASS INDEX: 34.41 KG/M2
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
## 2021-09-22 DIAGNOSIS — S09.90XA INJURY OF HEAD, INITIAL ENCOUNTER: Primary | ICD-10-CM

## 2021-09-22 PROCEDURE — 99284 EMERGENCY DEPT VISIT MOD MDM: CPT

## 2021-09-22 PROCEDURE — 70450 CT HEAD/BRAIN W/O DYE: CPT | Performed by: EMERGENCY MEDICINE

## 2021-09-22 NOTE — ED QUICK NOTES
Patient cleared for discharge by MD. Martinez with patient. Patient discharge instructions reviewed with patient including when and how to follow up  with healthcare provider and when to seek medical treatment.

## 2021-09-22 NOTE — ED INITIAL ASSESSMENT (HPI)
RAYO Lopez     Pt attempted to get off the couch and was holding on to her walker and fell on hard floor. Pt unsure if she hit her head but states she did not lose consciousness. Pt reports the back of the head hurts, denies any other complaints. Pt takes warfarin.  Pt a/

## 2021-09-23 NOTE — ED PROVIDER NOTES
Patient Seen in: [facility] Emergency Department    History   Patient presents with:  Fall      HPI    The patient presents to the ED after falling while trying to get up off the couch.   She states that she grabbed onto her walker in the wrong maryan during my examination was cleaned with super sani-cloth germicidal wipes following the exam.     Physical Exam  Vitals and nursing note reviewed. Constitutional:       General: She is not in acute distress. Appearance: She is well-developed.  She is o Diagnostic Considerations: Fall, head injury,    Medical Record Review: I personally reviewed available prior medical records for any recent pertinent discharge summaries, testing, and procedures and reviewed those reports. Complicating Factors:  The jd

## 2021-09-28 ENCOUNTER — APPOINTMENT (OUTPATIENT)
Dept: CT IMAGING | Facility: HOSPITAL | Age: 81
End: 2021-09-28
Attending: EMERGENCY MEDICINE
Payer: MEDICARE

## 2021-09-28 ENCOUNTER — HOSPITAL ENCOUNTER (EMERGENCY)
Facility: HOSPITAL | Age: 81
Discharge: HOME | End: 2021-09-28
Attending: EMERGENCY MEDICINE
Payer: MEDICARE

## 2021-09-28 VITALS
SYSTOLIC BLOOD PRESSURE: 131 MMHG | HEART RATE: 78 BPM | RESPIRATION RATE: 18 BRPM | OXYGEN SATURATION: 93 % | DIASTOLIC BLOOD PRESSURE: 72 MMHG | TEMPERATURE: 98 F
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
## 2021-09-28 PROCEDURE — 99284 EMERGENCY DEPT VISIT MOD MDM: CPT

## 2021-09-28 PROCEDURE — 72125 CT NECK SPINE W/O DYE: CPT | Performed by: EMERGENCY MEDICINE

## 2021-09-28 PROCEDURE — 93010 ELECTROCARDIOGRAM REPORT: CPT | Performed by: EMERGENCY MEDICINE

## 2021-09-28 PROCEDURE — 93005 ELECTROCARDIOGRAM TRACING: CPT

## 2021-09-28 PROCEDURE — 70450 CT HEAD/BRAIN W/O DYE: CPT | Performed by: EMERGENCY MEDICINE

## 2021-09-28 NOTE — ED PROVIDER NOTES
Patient Seen in: [facility] AND [facility] Emergency Department      History   Patient presents with:  Fall    Stated Complaint: fall on thinners    Subjective:   HPI    [de-identified] yoF on Coumadin presents for evaluation after a fall.   She bent down to feed her dog and Pupils: Pupils are equal, round, and reactive to light. Cardiovascular:      Rate and Rhythm: Normal rate and regular rhythm. Heart sounds: Normal heart sounds.    Pulmonary:      Effort: Pulmonary effort is normal.      Breath sounds: Normal br at 1:46 PM     Finalized by (CST): Demetri Tovar MD on 9/28/2021 at 1:49 PM                   [facility]            Pulse Oximeter:  Pulse oximetry on room air is 93%, indicating adequate oxygenation.   EMERGENCY DEPARTMENT COURSE AND TREATMENT:  Patient's condi

## 2021-09-28 NOTE — ED INITIAL ASSESSMENT (HPI)
Pt to ED s/p fall, pt states she was bending down to feed her dogs states she puts her hand in the sink to stabilize herself while bending down, pt is unsure if she became dizzy and fell or was unable to hold herself up. Hx of vertigo and is on warfarin.  P

## 2021-10-26 ENCOUNTER — APPOINTMENT (OUTPATIENT)
Dept: GENERAL RADIOLOGY | Facility: HOSPITAL | Age: 81
End: 2021-10-26
Attending: EMERGENCY MEDICINE
Payer: MEDICARE

## 2021-10-26 ENCOUNTER — APPOINTMENT (OUTPATIENT)
Dept: CT IMAGING | Facility: HOSPITAL | Age: 81
End: 2021-10-26
Attending: EMERGENCY MEDICINE
Payer: MEDICARE

## 2021-10-26 ENCOUNTER — HOSPITAL ENCOUNTER (EMERGENCY)
Facility: HOSPITAL | Age: 81
Discharge: HOME | End: 2021-10-26
Attending: EMERGENCY MEDICINE
Payer: MEDICARE

## 2021-10-26 VITALS
WEIGHT: 191 LBS | SYSTOLIC BLOOD PRESSURE: 121 MMHG | HEIGHT: 62 IN | HEART RATE: 60 BPM | BODY MASS INDEX: 35.15 KG/M2 | OXYGEN SATURATION: 95 % | RESPIRATION RATE: 18 BRPM | TEMPERATURE: 98 F | DIASTOLIC BLOOD PRESSURE: 75 MMHG
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
## 2021-10-26 DIAGNOSIS — S40.011A CONTUSION OF RIGHT SHOULDER, INITIAL ENCOUNTER: ICD-10-CM

## 2021-10-26 DIAGNOSIS — S00.83XA CONTUSION OF FACE, INITIAL ENCOUNTER: ICD-10-CM

## 2021-10-26 PROCEDURE — 85730 THROMBOPLASTIN TIME PARTIAL: CPT | Performed by: EMERGENCY MEDICINE

## 2021-10-26 PROCEDURE — 99284 EMERGENCY DEPT VISIT MOD MDM: CPT

## 2021-10-26 PROCEDURE — 36415 COLL VENOUS BLD VENIPUNCTURE: CPT

## 2021-10-26 PROCEDURE — 70450 CT HEAD/BRAIN W/O DYE: CPT | Performed by: EMERGENCY MEDICINE

## 2021-10-26 PROCEDURE — 73030 X-RAY EXAM OF SHOULDER: CPT | Performed by: EMERGENCY MEDICINE

## 2021-10-26 PROCEDURE — 70486 CT MAXILLOFACIAL W/O DYE: CPT | Performed by: EMERGENCY MEDICINE

## 2021-10-26 PROCEDURE — 85610 PROTHROMBIN TIME: CPT | Performed by: EMERGENCY MEDICINE

## 2021-10-26 NOTE — ED PROVIDER NOTES
Patient Seen in: [facility] AND [facility] Emergency Department      History   Patient presents with:  Fall    Stated Complaint: fall, facial bruising +thinners    Subjective:   HPI    Patient is an 15-year-old female who arrives with her daughter who lives with following components:       Result Value    PT 25.5 (*)     INR 2.35 (*)     All other components within normal limits   PTT, ACTIVATED - Abnormal; Notable for the following components:    PTT 36.1 (*)     All other components within normal limits primary care provider within the next three months to obtain basic health screening including reassessment of your blood pressure.       Medications Prescribed:  Current Discharge Medication List

## 2021-12-08 ENCOUNTER — APPOINTMENT (OUTPATIENT)
Dept: GENERAL RADIOLOGY | Facility: HOSPITAL | Age: 81
End: 2021-12-08
Attending: EMERGENCY MEDICINE
Payer: MEDICARE

## 2021-12-08 ENCOUNTER — HOSPITAL ENCOUNTER (EMERGENCY)
Facility: HOSPITAL | Age: 81
Discharge: HOME | End: 2021-12-08
Attending: EMERGENCY MEDICINE
Payer: MEDICARE

## 2021-12-08 ENCOUNTER — APPOINTMENT (OUTPATIENT)
Dept: CT IMAGING | Facility: HOSPITAL | Age: 81
End: 2021-12-08
Attending: EMERGENCY MEDICINE
Payer: MEDICARE

## 2021-12-08 VITALS
HEART RATE: 62 BPM | TEMPERATURE: 98 F | BODY MASS INDEX: 33.86 KG/M2 | SYSTOLIC BLOOD PRESSURE: 133 MMHG | HEIGHT: 62 IN | WEIGHT: 184 LBS | OXYGEN SATURATION: 94 % | RESPIRATION RATE: 18 BRPM | DIASTOLIC BLOOD PRESSURE: 77 MMHG

## 2021-12-08 DIAGNOSIS — S70.02XA CONTUSION OF LEFT HIP, INITIAL ENCOUNTER: ICD-10-CM

## 2021-12-08 DIAGNOSIS — S50.02XA CONTUSION OF LEFT ELBOW, INITIAL ENCOUNTER: ICD-10-CM

## 2021-12-08 DIAGNOSIS — T14.8XXA HEMATOMA AND CONTUSION: ICD-10-CM

## 2021-12-08 DIAGNOSIS — S40.012A CONTUSION OF LEFT SHOULDER, INITIAL ENCOUNTER: Primary | ICD-10-CM

## 2021-12-08 PROCEDURE — 73030 X-RAY EXAM OF SHOULDER: CPT | Performed by: EMERGENCY MEDICINE

## 2021-12-08 PROCEDURE — 36415 COLL VENOUS BLD VENIPUNCTURE: CPT

## 2021-12-08 PROCEDURE — 70450 CT HEAD/BRAIN W/O DYE: CPT | Performed by: EMERGENCY MEDICINE

## 2021-12-08 PROCEDURE — 73502 X-RAY EXAM HIP UNI 2-3 VIEWS: CPT | Performed by: EMERGENCY MEDICINE

## 2021-12-08 PROCEDURE — 99284 EMERGENCY DEPT VISIT MOD MDM: CPT

## 2021-12-08 PROCEDURE — 85610 PROTHROMBIN TIME: CPT | Performed by: EMERGENCY MEDICINE

## 2021-12-08 PROCEDURE — 93010 ELECTROCARDIOGRAM REPORT: CPT | Performed by: EMERGENCY MEDICINE

## 2021-12-08 PROCEDURE — 93005 ELECTROCARDIOGRAM TRACING: CPT

## 2021-12-08 NOTE — ED QUICK NOTES
Patient notes dizzy spell from vertigo causing her to fall over. Patient states she fell into the wall and then lowered to the ground, complains of left hip pain. Does admit to hitting head, no loc.    Patient is able to stand and ambulate with assistan

## 2021-12-08 NOTE — ED INITIAL ASSESSMENT (HPI)
Pt states she has vertigo and she felt a dizzy spell and couldn't hold her self anymore and fell on the hard floor in her house. Pt states she hit her left knee and left hip. Pt denies LOC.

## 2021-12-08 NOTE — ED PROVIDER NOTES
Patient Seen in: [facility] AND [facility] Emergency Department      History   Patient presents with:  Fall    Stated Complaint: Fall    Subjective:   HPI    49-year-old female who is been struggling with vertigo for a long time that was worse last several days rate, regular rhythm and intact distal pulses. Pulmonary/Chest: Effort normal. No respiratory distress. Abdominal: Soft. There is no tenderness. There is no guarding.    Musculoskeletal: There is mild pain to palpation with good range of motion of the Hypertrophic degeneration left AC joint    Dictated by (CST): Lai Nielson MD on 12/08/2021 at 4:28 PM     Finalized by (CST): Lai Nielson MD on 12/08/2021 at 4:32 PM          CT BRAIN OR HEAD (43236)    Result Date: 12/8/2021  PROCEDURE: CT INDICATIONS: Evaluate generalized left hip and shoulder pain post dizziness and fall today. TECHNIQUE: AP pelvis and AP oblique left hip views were obtained. FINDINGS:  BONES: No acute fracture dislocation.   Mild left left hip periacetabular spur format

## 2022-01-09 ENCOUNTER — APPOINTMENT (OUTPATIENT)
Dept: GENERAL RADIOLOGY | Facility: HOSPITAL | Age: 82
DRG: 918 | End: 2022-01-09
Attending: EMERGENCY MEDICINE
Payer: MEDICARE

## 2022-01-09 ENCOUNTER — HOSPITAL ENCOUNTER (INPATIENT)
Facility: HOSPITAL | Age: 82
LOS: 1 days | Discharge: HOME | DRG: 918 | End: 2022-01-10
Attending: EMERGENCY MEDICINE | Admitting: HOSPITALIST
Payer: MEDICARE

## 2022-01-09 ENCOUNTER — APPOINTMENT (OUTPATIENT)
Dept: CT IMAGING | Facility: HOSPITAL | Age: 82
DRG: 918 | End: 2022-01-09
Attending: EMERGENCY MEDICINE
Payer: MEDICARE

## 2022-01-09 ENCOUNTER — HOSPITAL ENCOUNTER (INPATIENT)
Facility: HOSPITAL | Age: 82
LOS: 1 days | Discharge: HOME HEALTH | DRG: 918 | End: 2022-01-10
Attending: EMERGENCY MEDICINE | Admitting: HOSPITALIST
Payer: MEDICARE

## 2022-01-09 DIAGNOSIS — T42.0X1A ACCIDENTAL PHENYTOIN POISONING, INITIAL ENCOUNTER: Primary | ICD-10-CM

## 2022-01-09 LAB
ANION GAP SERPL CALC-SCNC: 4 MMOL/L (ref 0–18)
BASOPHILS # BLD AUTO: 0.03 X10(3) UL (ref 0–0.2)
BASOPHILS NFR BLD AUTO: 0.7 %
BILIRUB UR QL: NEGATIVE
BUN BLD-MCNC: 19 MG/DL (ref 7–18)
BUN/CREAT SERPL: 17.4 (ref 10–20)
CALCIUM BLD-MCNC: 9.1 MG/DL (ref 8.5–10.1)
CARBAMAZEPINE SERPL-MCNC: 2.8 UG/ML (ref 4–12)
CHLORIDE SERPL-SCNC: 104 MMOL/L (ref 98–112)
CLARITY UR: CLEAR
CO2 SERPL-SCNC: 33 MMOL/L (ref 21–32)
COLOR UR: YELLOW
CREAT BLD-MCNC: 1.09 MG/DL
DEPRECATED RDW RBC AUTO: 46.2 FL (ref 35.1–46.3)
DIGOXIN SERPL-MCNC: 0.79 NG/ML (ref 0.8–2)
EOSINOPHIL # BLD AUTO: 0.2 X10(3) UL (ref 0–0.7)
EOSINOPHIL NFR BLD AUTO: 4.4 %
ERYTHROCYTE [DISTWIDTH] IN BLOOD BY AUTOMATED COUNT: 13.3 % (ref 11–15)
GLUCOSE BLD-MCNC: 94 MG/DL (ref 70–99)
GLUCOSE UR-MCNC: NEGATIVE MG/DL
HCT VFR BLD AUTO: 38.8 %
HGB BLD-MCNC: 12.2 G/DL
IMM GRANULOCYTES # BLD AUTO: 0.02 X10(3) UL (ref 0–1)
IMM GRANULOCYTES NFR BLD: 0.4 %
INR BLD: 2.55 (ref 0.8–1.2)
KETONES UR-MCNC: NEGATIVE MG/DL
LEUKOCYTE ESTERASE UR QL STRIP.AUTO: NEGATIVE
LYMPHOCYTES # BLD AUTO: 1.57 X10(3) UL (ref 1–4)
LYMPHOCYTES NFR BLD AUTO: 34.5 %
MCH RBC QN AUTO: 29.6 PG (ref 26–34)
MCHC RBC AUTO-ENTMCNC: 31.4 G/DL (ref 31–37)
MCV RBC AUTO: 94.2 FL
MONOCYTES # BLD AUTO: 0.61 X10(3) UL (ref 0.1–1)
MONOCYTES NFR BLD AUTO: 13.4 %
NEUTROPHILS # BLD AUTO: 2.12 X10 (3) UL (ref 1.5–7.7)
NEUTROPHILS # BLD AUTO: 2.12 X10(3) UL (ref 1.5–7.7)
NEUTROPHILS NFR BLD AUTO: 46.6 %
NITRITE UR QL STRIP.AUTO: NEGATIVE
OSMOLALITY SERPL CALC.SUM OF ELEC: 294 MOSM/KG (ref 275–295)
PH UR: 7 [PH] (ref 5–8)
PHENYTOIN SERPL-MCNC: 33 UG/ML (ref 10–20)
PLATELET # BLD AUTO: 207 10(3)UL (ref 150–450)
POTASSIUM SERPL-SCNC: 3.7 MMOL/L (ref 3.5–5.1)
PROT UR-MCNC: NEGATIVE MG/DL
PROTHROMBIN TIME: 27.8 SECONDS (ref 11.6–14.8)
RBC # BLD AUTO: 4.12 X10(6)UL
SARS-COV-2 RNA RESP QL NAA+PROBE: NOT DETECTED
SODIUM SERPL-SCNC: 141 MMOL/L (ref 136–145)
SP GR UR STRIP: 1.01 (ref 1–1.03)
TSI SER-ACNC: 1.31 MIU/ML (ref 0.36–3.74)
UROBILINOGEN UR STRIP-ACNC: <2
WBC # BLD AUTO: 4.6 X10(3) UL (ref 4–11)

## 2022-01-09 PROCEDURE — 99223 1ST HOSP IP/OBS HIGH 75: CPT | Performed by: OTHER

## 2022-01-09 PROCEDURE — 71045 X-RAY EXAM CHEST 1 VIEW: CPT | Performed by: EMERGENCY MEDICINE

## 2022-01-09 PROCEDURE — 99223 1ST HOSP IP/OBS HIGH 75: CPT | Performed by: HOSPITALIST

## 2022-01-09 PROCEDURE — 70450 CT HEAD/BRAIN W/O DYE: CPT | Performed by: EMERGENCY MEDICINE

## 2022-01-09 RX ORDER — METOPROLOL SUCCINATE 50 MG/1
50 TABLET, EXTENDED RELEASE ORAL 2 TIMES DAILY
Status: DISCONTINUED | OUTPATIENT
Start: 2022-01-09 | End: 2022-01-11

## 2022-01-09 RX ORDER — POLYETHYLENE GLYCOL 3350 17 G/17G
17 POWDER, FOR SOLUTION ORAL DAILY PRN
Status: DISCONTINUED | OUTPATIENT
Start: 2022-01-09 | End: 2022-01-11

## 2022-01-09 RX ORDER — SOTALOL HYDROCHLORIDE 80 MG/1
80 TABLET ORAL DAILY
Status: DISCONTINUED | OUTPATIENT
Start: 2022-01-10 | End: 2022-01-11

## 2022-01-09 RX ORDER — ACETAMINOPHEN 325 MG/1
650 TABLET ORAL EVERY 6 HOURS PRN
Status: DISCONTINUED | OUTPATIENT
Start: 2022-01-09 | End: 2022-01-11

## 2022-01-09 RX ORDER — CLOTRIMAZOLE AND BETAMETHASONE DIPROPIONATE 10; .64 MG/G; MG/G
CREAM TOPICAL 2 TIMES DAILY
COMMUNITY

## 2022-01-09 RX ORDER — ONDANSETRON 2 MG/ML
4 INJECTION INTRAMUSCULAR; INTRAVENOUS EVERY 6 HOURS PRN
Status: DISCONTINUED | OUTPATIENT
Start: 2022-01-09 | End: 2022-01-11

## 2022-01-09 RX ORDER — LEVOTHYROXINE SODIUM 88 UG/1
88 TABLET ORAL
Status: DISCONTINUED | OUTPATIENT
Start: 2022-01-10 | End: 2022-01-11

## 2022-01-09 RX ORDER — ACETAMINOPHEN 160 MG
2000 TABLET,DISINTEGRATING ORAL DAILY
COMMUNITY

## 2022-01-09 RX ORDER — WARFARIN SODIUM 2 MG/1
2 TABLET ORAL
Status: DISCONTINUED | OUTPATIENT
Start: 2022-01-10 | End: 2022-01-11

## 2022-01-09 RX ORDER — SPIRONOLACTONE 25 MG/1
25 TABLET ORAL DAILY
Status: DISCONTINUED | OUTPATIENT
Start: 2022-01-09 | End: 2022-01-11

## 2022-01-09 RX ORDER — DIGOXIN 125 MCG
125 TABLET ORAL DAILY
Status: DISCONTINUED | OUTPATIENT
Start: 2022-01-10 | End: 2022-01-11

## 2022-01-09 RX ORDER — BISACODYL 10 MG
10 SUPPOSITORY, RECTAL RECTAL
Status: DISCONTINUED | OUTPATIENT
Start: 2022-01-09 | End: 2022-01-11

## 2022-01-09 RX ORDER — SODIUM PHOSPHATE, DIBASIC AND SODIUM PHOSPHATE, MONOBASIC 7; 19 G/133ML; G/133ML
1 ENEMA RECTAL ONCE AS NEEDED
Status: DISCONTINUED | OUTPATIENT
Start: 2022-01-09 | End: 2022-01-11

## 2022-01-09 RX ORDER — GABAPENTIN 100 MG/1
100 CAPSULE ORAL NIGHTLY
COMMUNITY

## 2022-01-09 RX ORDER — METOPROLOL SUCCINATE 50 MG/1
50 TABLET, EXTENDED RELEASE ORAL 2 TIMES DAILY
COMMUNITY

## 2022-01-09 RX ORDER — WARFARIN SODIUM 2 MG/1
3 TABLET ORAL
Status: DISCONTINUED | OUTPATIENT
Start: 2022-01-09 | End: 2022-01-11

## 2022-01-09 RX ORDER — LEVOTHYROXINE SODIUM 88 UG/1
88 TABLET ORAL
Status: DISCONTINUED | OUTPATIENT
Start: 2022-01-09 | End: 2022-01-09

## 2022-01-09 RX ORDER — FAMOTIDINE 20 MG/1
20 TABLET, FILM COATED ORAL DAILY
Status: DISCONTINUED | OUTPATIENT
Start: 2022-01-09 | End: 2022-01-11

## 2022-01-09 RX ORDER — SENNOSIDES 8.6 MG
17.2 TABLET ORAL NIGHTLY PRN
Status: DISCONTINUED | OUTPATIENT
Start: 2022-01-09 | End: 2022-01-11

## 2022-01-09 RX ORDER — FAMOTIDINE 20 MG/1
20 TABLET, FILM COATED ORAL 2 TIMES DAILY
Status: DISCONTINUED | OUTPATIENT
Start: 2022-01-09 | End: 2022-01-09

## 2022-01-09 RX ORDER — ATORVASTATIN CALCIUM 40 MG/1
40 TABLET, FILM COATED ORAL NIGHTLY
Status: DISCONTINUED | OUTPATIENT
Start: 2022-01-09 | End: 2022-01-11

## 2022-01-09 RX ORDER — ACETAMINOPHEN 500 MG
500 TABLET ORAL DAILY
COMMUNITY

## 2022-01-09 RX ORDER — PHENYTOIN SODIUM 100 MG/1
100 CAPSULE, EXTENDED RELEASE ORAL 3 TIMES DAILY
Status: DISCONTINUED | OUTPATIENT
Start: 2022-01-10 | End: 2022-01-11

## 2022-01-09 RX ORDER — CARBAMAZEPINE 100 MG/1
100 TABLET, CHEWABLE ORAL 2 TIMES DAILY
Status: DISCONTINUED | OUTPATIENT
Start: 2022-01-09 | End: 2022-01-09

## 2022-01-09 RX ORDER — SODIUM CHLORIDE 9 MG/ML
INJECTION, SOLUTION INTRAVENOUS CONTINUOUS
Status: DISCONTINUED | OUTPATIENT
Start: 2022-01-09 | End: 2022-01-10

## 2022-01-09 RX ORDER — HEPARIN SODIUM 5000 [USP'U]/ML
5000 INJECTION, SOLUTION INTRAVENOUS; SUBCUTANEOUS EVERY 8 HOURS SCHEDULED
Status: DISCONTINUED | OUTPATIENT
Start: 2022-01-09 | End: 2022-01-09

## 2022-01-09 RX ORDER — MONTELUKAST SODIUM 10 MG/1
10 TABLET ORAL NIGHTLY
Status: DISCONTINUED | OUTPATIENT
Start: 2022-01-09 | End: 2022-01-11

## 2022-01-09 RX ORDER — CARBAMAZEPINE 100 MG/1
100 TABLET, CHEWABLE ORAL 3 TIMES DAILY
Status: DISCONTINUED | OUTPATIENT
Start: 2022-01-10 | End: 2022-01-11

## 2022-01-09 RX ORDER — METOCLOPRAMIDE HYDROCHLORIDE 5 MG/ML
10 INJECTION INTRAMUSCULAR; INTRAVENOUS EVERY 8 HOURS PRN
Status: DISCONTINUED | OUTPATIENT
Start: 2022-01-09 | End: 2022-01-11

## 2022-01-09 RX ORDER — GUAIFENESIN 400 MG
2000 TABLET ORAL
COMMUNITY

## 2022-01-09 NOTE — PLAN OF CARE
Problem: Patient Centered Care  Goal: Patient preferences are identified and integrated in the patient's plan of care  Description: Interventions:  - What would you like us to know as we care for you?  I live with my daughter in law  - Provide timely, com Progressing     Problem: METABOLIC/FLUID AND ELECTROLYTES - ADULT  Goal: Electrolytes maintained within normal limits  Description: INTERVENTIONS:  - Monitor labs and rhythm and assess patient for signs and symptoms of electrolyte imbalances  - Administer or hip dislocation precautions)  Outcome: Progressing     Problem: NEUROLOGICAL - ADULT  Goal: Achieves stable or improved neurological status  Description: INTERVENTIONS  - Assess for and report changes in neurological status  - Initiate measures to preve Administer analgesics based on type and severity of pain and evaluate response  - Implement non-pharmacological measures as appropriate and evaluate response  - Consider cultural and social influences on pain and pain management  - Manage/alleviate anxiety appropriate  - Assess patient's ability to be responsible for managing their own health  - Refer to Case Management Department for coordinating discharge planning if the patient needs post-hospital services based on physician/LIP order or complex needs rel

## 2022-01-09 NOTE — ED INITIAL ASSESSMENT (HPI)
Patient aox3 to ed via private vehicle patient co of weakness x few days, family called 911 due to concern of increased weakness.  Denies pain

## 2022-01-09 NOTE — ED PROVIDER NOTES
Patient Seen in: [facility] AND [facility] Emergency Department      History   Patient presents with:  Weakness    Stated Complaint: weakness    Subjective:   HPI  80 yoF with atrial fibrillation on coumadin and digoxin with pacemaker, hypothyroidism on Synthroi 01/09/22 0944 62   Resp 01/09/22 0944 18   Temp 01/09/22 0945 98 °F (36.7 °C)   Temp src 01/09/22 0945 Oral   SpO2 01/09/22 0944 91 %   O2 Device 01/09/22 0944 None (Room air)       Current:/68 (BP Location: Right leg)   Pulse 62   Temp 97.4 °F (36.3 CO2 33.0 (*)     BUN 19 (*)     Creatinine 1.09 (*)     GFR, Non- 48 (*)     GFR, -American 55 (*)     All other components within normal limits   PROTHROMBIN TIME (PT) - Abnormal; Notable for the following components:    PT 27.8 posterior circulations. 4.  Mild chronic right maxillary sinus inflammation.   Dictated by (CST): Nancy Alfred MD on 1/09/2022 at 11:30 AM     Finalized by (CST): Nancy Alfred MD on 1/09/2022 at 11:31 AM          XR CHEST AP PORTABLE  (CPT=71045)    Result ICD-10-CM Noted POA    * (Principal) Accidental phenytoin poisoning, initial encounter T42. 0X1A 1/9/2022 Unknown    Accidental phenytoin poisoning T42. 0X1A 1/9/2022 Unknown

## 2022-01-09 NOTE — H&P
[facility] Patient Status:  Inpatient    10/13/1940 MRN [id number]   Location [facility] 5SW/SE Attending Amanda Bunch MD   Hosp Day # 0 NENA Real     Date:  2022  Johnson a day.  acetaminophen 500 MG Oral Tab, Take 500 mg by mouth daily. Cyanocobalamin (VITAMIN B-12 ER) 2000 MCG Oral Tab CR, Take 2,000 mcg by mouth. cholecalciferol 50 MCG (2000 UT) Oral Cap, Take 2,000 Units by mouth daily.   clotrimazole-betamethasone 1-0 Pertinent positives and negatives noted in the the HPI.     Physical Exam:     /68 (BP Location: Right leg)   Pulse 62   Temp 97.4 °F (36.3 °C) (Oral)   Resp 20   SpO2 92%   Temp (24hrs), Av.7 °F (36.5 °C), Min:97.4 °F (36.3 °C), Max:98 °F (36.7 ° 11:30 AM     Finalized by (CST): Timo Molina MD on 1/09/2022 at 11:31 AM          XR CHEST AP PORTABLE  (CPT=71045)    Result Date: 1/9/2022  CONCLUSION: No acute cardiopulmonary abnormality.    Dictated by (CST): Timo Molina MD on 1/09/2022 at 11:06 AM

## 2022-01-09 NOTE — ED QUICK NOTES
Orders for admission, patient is aware of plan and ready to go upstairs. Any questions, please call ED RN jesus  at extension [phone].    Patient presents with:  Weakness      Patient AO x 2/3 hx of dementia  Ambulation: VERY UNSTEADY GAIT  Belongings: accompa

## 2022-01-10 VITALS
OXYGEN SATURATION: 95 % | BODY MASS INDEX: 34 KG/M2 | WEIGHT: 184.06 LBS | HEART RATE: 68 BPM | TEMPERATURE: 98 F | SYSTOLIC BLOOD PRESSURE: 150 MMHG | DIASTOLIC BLOOD PRESSURE: 63 MMHG | RESPIRATION RATE: 18 BRPM

## 2022-01-10 LAB
ALBUMIN SERPL-MCNC: 3 G/DL (ref 3.4–5)
ALBUMIN/GLOB SERPL: 1 {RATIO} (ref 1–2)
ALP LIVER SERPL-CCNC: 81 U/L
ALT SERPL-CCNC: 16 U/L
ANION GAP SERPL CALC-SCNC: 5 MMOL/L (ref 0–18)
AST SERPL-CCNC: 22 U/L (ref 15–37)
BASOPHILS # BLD AUTO: 0.06 X10(3) UL (ref 0–0.2)
BASOPHILS NFR BLD AUTO: 1.2 %
BILIRUB SERPL-MCNC: 0.3 MG/DL (ref 0.1–2)
BUN BLD-MCNC: 15 MG/DL (ref 7–18)
BUN/CREAT SERPL: 18.8 (ref 10–20)
CALCIUM BLD-MCNC: 8.4 MG/DL (ref 8.5–10.1)
CHLORIDE SERPL-SCNC: 108 MMOL/L (ref 98–112)
CO2 SERPL-SCNC: 31 MMOL/L (ref 21–32)
CREAT BLD-MCNC: 0.8 MG/DL
DEPRECATED RDW RBC AUTO: 44.8 FL (ref 35.1–46.3)
EOSINOPHIL # BLD AUTO: 0.22 X10(3) UL (ref 0–0.7)
EOSINOPHIL NFR BLD AUTO: 4.3 %
ERYTHROCYTE [DISTWIDTH] IN BLOOD BY AUTOMATED COUNT: 13 % (ref 11–15)
GLOBULIN PLAS-MCNC: 2.9 G/DL (ref 2.8–4.4)
GLUCOSE BLD-MCNC: 90 MG/DL (ref 70–99)
HCT VFR BLD AUTO: 35.4 %
HGB BLD-MCNC: 11.5 G/DL
IMM GRANULOCYTES # BLD AUTO: 0.02 X10(3) UL (ref 0–1)
IMM GRANULOCYTES NFR BLD: 0.4 %
INR BLD: 2.67 (ref 0.8–1.2)
LYMPHOCYTES # BLD AUTO: 1.77 X10(3) UL (ref 1–4)
LYMPHOCYTES NFR BLD AUTO: 34.3 %
MAGNESIUM SERPL-MCNC: 2.3 MG/DL (ref 1.6–2.6)
MCH RBC QN AUTO: 30.4 PG (ref 26–34)
MCHC RBC AUTO-ENTMCNC: 32.5 G/DL (ref 31–37)
MCV RBC AUTO: 93.7 FL
MONOCYTES # BLD AUTO: 0.68 X10(3) UL (ref 0.1–1)
MONOCYTES NFR BLD AUTO: 13.2 %
NEUTROPHILS # BLD AUTO: 2.41 X10 (3) UL (ref 1.5–7.7)
NEUTROPHILS # BLD AUTO: 2.41 X10(3) UL (ref 1.5–7.7)
NEUTROPHILS NFR BLD AUTO: 46.6 %
OSMOLALITY SERPL CALC.SUM OF ELEC: 298 MOSM/KG (ref 275–295)
PHENYTOIN SERPL-MCNC: 31.1 UG/ML (ref 10–20)
PLATELET # BLD AUTO: 180 10(3)UL (ref 150–450)
POTASSIUM SERPL-SCNC: 3.7 MMOL/L (ref 3.5–5.1)
PROT SERPL-MCNC: 5.9 G/DL (ref 6.4–8.2)
PROTHROMBIN TIME: 28.8 SECONDS (ref 11.6–14.8)
RBC # BLD AUTO: 3.78 X10(6)UL
SODIUM SERPL-SCNC: 144 MMOL/L (ref 136–145)
WBC # BLD AUTO: 5.2 X10(3) UL (ref 4–11)

## 2022-01-10 PROCEDURE — 99232 SBSQ HOSP IP/OBS MODERATE 35: CPT | Performed by: OTHER

## 2022-01-10 PROCEDURE — 99232 SBSQ HOSP IP/OBS MODERATE 35: CPT | Performed by: HOSPITALIST

## 2022-01-10 NOTE — CONSULTS
Consult dictated. Check Dilantin level tomorrow morning. Change Tegretol 200 mg p.o. 3 times daily. Resume Dilantin tomorrow. Please check a trough Dilantin, Tegretol level in 1 week. Please see dictation. Thank you for consult.

## 2022-01-10 NOTE — OCCUPATIONAL THERAPY NOTE
OCCUPATIONAL THERAPY EVALUATION - INPATIENT     Room Number: 526/526-A  Evaluation Date: 1/10/2022  Type of Evaluation: Initial  Presenting Problem: accidental phenytoin poisoning    Physician Order: IP Consult to Occupational Therapy  Reason for Therapy: Diagnosis Date   • Arrhythmia    • Congestive heart disease ([facility] [address]    • Essential hypertension    • Seizure disorder ([facility] [address]    • Thyroid disease        Past Surgical History  Past Surgical History:   Procedure Laterality Date   • TOTAL ABDOM HYSTERECTOMY will complete toileting with sup  Comment:     Patient will tolerate standing for 3 minutes in prep for adls with sup   Comment:    Patient will complete item retrieval with sup  Comment:          Goals  on: 22  Frequency: 3-5x/wk    Airam Roman

## 2022-01-10 NOTE — CONSULTS
[facility]    PATIENT'S NAME: Chevy Manzano   ATTENDING PHYSICIAN: Thai Sagastume MD   CONSULTING PHYSICIAN: Dixie Ramirez MD   PATIENT ACCOUNT#:   [id number]    LOCATION:  [address] [place] [address] #:   [id number]       DATE OF BIRTH: language intact. LABORATORY DATA:  Tegretol level low at 2.8. Dilantin level was 33 and Dilantin has been held. Other labs reviewed. She had CT of the brain in the emergency room. Report reviewed. IMPRESSION:  Dilantin toxicity.   Will resu

## 2022-01-10 NOTE — SLP NOTE
ADULT SWALLOWING EVALUATION    ASSESSMENT    ASSESSMENT/OVERALL IMPRESSION:  PPE REQUIRED. THIS THERAPIST WORE GLOVES, DROPLET MASK, AND GOGGLES FOR DURATION OF EVALUATION. HANDS WASHED UPON ENTRANCE/EXIT. SLP BSSE orders received and acknowledged.  JUNIE rodriguez recommendations and POC. PLAN: SLP to f/u x2 meal assessments, monitor CXR, and VFSS if clinically warranted. RECOMMENDATIONS   Diet Recommendations - Solids: Regular  Diet Recommendations - Liquids:  Thin Liquids (no straws)    Compensatory Strateg on 1/09/2022 at 11:31 AM            OBJECTIVE   ORAL MOTOR EXAMINATION  Dentition: Upper dentures; Lower dentures  Symmetry: Within Functional Limits  Strength:  Within Functional Limits  Tone: Within Functional Limits  Range of Motion: Within Functional Flood please contact Karishma Palm, MONIE Toney  Speech Language Pathologist  Phone Number Ext. [phone]

## 2022-01-10 NOTE — PLAN OF CARE
Problem: Patient Centered Care  Goal: Patient preferences are identified and integrated in the patient's plan of care  Description: Interventions:  - What would you like us to know as we care for you? \"I am from home with my daughter in law. \"  - Provid Implement strategies to promote bladder emptying  Outcome: Progressing     Problem: METABOLIC/FLUID AND ELECTROLYTES - ADULT  Goal: Electrolytes maintained within normal limits  Description: INTERVENTIONS:  - Monitor labs and rhythm and assess patient for appropriate assistive device and precautions (e.g. spinal or hip dislocation precautions)  Outcome: Progressing     Problem: NEUROLOGICAL - ADULT  Goal: Achieves stable or improved neurological status  Description: INTERVENTIONS  - Assess for and report ch assistance  - Assess pain using appropriate pain scale  - Administer analgesics based on type and severity of pain and evaluate response  - Implement non-pharmacological measures as appropriate and evaluate response  - Consider cultural and social influenc patient/family/discharge partner  - Complete POLST form as appropriate  - Assess patient's ability to be responsible for managing their own health  - Refer to Case Management Department for coordinating discharge planning if the patient needs post-hospital

## 2022-01-10 NOTE — PHYSICAL THERAPY NOTE
PHYSICAL THERAPY EVALUATION - INPATIENT     Room Number: 526/526-A  Evaluation Date: 1/10/2022  Type of Evaluation: Initial   Physician Order: PT Eval and Treat    Presenting Problem: accidental phenytoin poisoning; weakness; fall at home; lethargy; confu 5x/week       PHYSICAL THERAPY MEDICAL/SOCIAL HISTORY     History related to current admission: Per H&P: \"Ava Simpson is a(n) 80year old female with PMH of afib on coumadin, seizure d/o, htn, chf, hypothyroidism and pacemaker placement presents with WEIGHT BEARING RESTRICTION                PAIN ASSESSMENT             COGNITION  · Following Commands:  follows multistep commands consistently        BALANCE  Static Sitting: Fair +  Dynamic Sitting: Fair +  Static Standing: Fair -  Dynamic Standing: Current Status    Goal #2 Patient is able to demonstrate transfers Sit to/from Stand at assistance level: modified independent with walker - rolling     Goal #2  Current Status    Goal #3 Patient is able to ambulate 75 feet with assist device: walker - r

## 2022-01-10 NOTE — PROGRESS NOTES
[facility]    Progress Note    Jennifer Victor Patient Status:  Inpatient    10/13/1940 MRN [id number]   Location [facility] 5SW/SE Attending Khloe Moya MD   Hosp Day # 1 PCP Garth Sanchez       Subjective:    Pinky Appl (COUMADIN) tab 2 mg, 2 mg, Oral, Once per day on Mon Wed Fri Sat  •  digoxin (LANOXIN) tab 125 mcg, 125 mcg, Oral, Daily  •  levothyroxine tab 88 mcg, 88 mcg, Oral, Before breakfast  •  famotidine (PEPCID) tab 20 mg, 20 mg, Oral, Daily  •  warfarin (COUMAD by (CST): Ed Griffiths MD on 1/09/2022 at 11:30 AM     Finalized by (CST): Ed Griffiths MD on 1/09/2022 at 11:31 AM          XR CHEST AP PORTABLE  (CPT=71045)    Result Date: 1/9/2022  CONCLUSION: No acute cardiopulmonary abnormality.    Dictated by (CST):

## 2022-01-10 NOTE — CM/SW NOTE
01/10/22 0900   CM/SW Referral Data   Referral Source    Reason for Referral Discharge planning   Informant Daughter   Pertinent Medical Hx   Does patient have an established PCP?  Yes  (Arpita Wan)   Patient Info   Patient's Current Me

## 2022-01-11 NOTE — PLAN OF CARE
Patient stated she was going home and will not stay any longer. This RN and the RN Team Leader educated patient on the safety risks of going home early, the plan of care, and that discharge criteria was not met.  She demanded to leave against medical advise

## 2022-01-11 NOTE — PROGRESS NOTES
[facility]    Progress Note    Carlene Osuna Patient Status:  Inpatient    10/13/1940 MRN [id number]   Location [facility] 5SW/SE Attending Michaela Ji MD   Hosp Day # 1 PCP Amaury Bardales       Subjective:    Kadeem Red 88 mcg, Oral, Before breakfast  famotidine (PEPCID) tab 20 mg, 20 mg, Oral, Daily  warfarin (COUMADIN) tab 3 mg, 3 mg, Oral, Once per day on Sun Tue Thu  carBAMazepine (TEGRETOL) chewable tab 100 mg, 100 mg, Oral, TID  phenytoin (DILANTIN) ER cap 100 mg, 1 1/09/2022 at 11:31 AM          XR CHEST AP PORTABLE  (CPT=71045)    Result Date: 1/9/2022  CONCLUSION: No acute cardiopulmonary abnormality.    Dictated by (CST): Taya Patton MD on 1/09/2022 at 11:06 AM     Finalized by (CST): Taya Patton MD on 1/09/2022

## 2022-01-11 NOTE — PLAN OF CARE
Updated pt and pt's dtr in law on POC. Pt saw speech therapy in the am. Pt's IV infiltrated; Per Dr. Roman Franco, ok to discontinue fluids and pt to have no IV access. No other acute changes throughout the shift.    Problem: Patient Centered Care  Goal: Patient empty bladder  - Monitor intake/output and perform bladder scan as needed  - Follow urinary retention protocol/standard of care  - Consider collaborating with pharmacy to review patient's medication profile  - Implement strategies to promote bladder emptyi Progressing  Goal: Maintain proper alignment of affected body part  Description: INTERVENTIONS:  - Support and protect limb and body alignment per provider's orders  - Instruct and reinforce with patient and family use of appropriate assistive device and p assistive/communication devices  Outcome: Progressing     Problem: PAIN - ADULT  Goal: Verbalizes/displays adequate comfort level or patient's stated pain goal  Description: INTERVENTIONS:  - Encourage pt to monitor pain and request assistance  - Assess pa for needed discharge resources and transportation as appropriate  - Identify discharge learning needs (meds, wound care, etc)  - Arrange for interpreters to assist at discharge as needed  - Consider post-discharge preferences of patient/family/discharge pa

## 2022-01-21 NOTE — DISCHARGE SUMMARY
[facility]    Discharge Summary    Herminio Du Patient Status:  Inpatient    10/13/1940 MRN [id number]   Location [facility] 5SW/SE Attending No att. providers found   Hosp Day # 1 PCP [address]     Date o medications      Instructions Prescription details   acetaminophen 500 MG Tabs  Commonly known as: TYLENOL EXTRA STRENGTH      Take 500 mg by mouth daily.    Refills: 0     alendronate 10 MG Tabs  Commonly known as: FOSAMAX      Take 70 mg by mouth once a w mouth nightly. Refills: 0     nitroGLYCERIN 0.2 MG/HR Pt24  Commonly known as: NITRODUR      Place 1 patch onto the skin daily.  Remove at nighttime   Quantity: 90 patch  Refills: 3     Phenytoin Sodium Extended 200 MG Caps  Commonly known as: DILANTIN

## 2023-02-09 NOTE — ED INITIAL ASSESSMENT (HPI)
Pt to EMS s/p mechanical fall at home with her walker. Pt states she fell backwards hitting head. Pt c/o mild headache and dizziness. Pt on coumadin. Pt is alert and oriented x4. Pt denies chest pain or sob. No respiratory distress noted. 96% on room air. no

## (undated) NOTE — IP AVS SNAPSHOT
Patient Demographics     Address  [address] [place] [address] Phone  [phone] Health system) E-mail Address  [email or web address]      Emergency Contact(s)     Name Relation Home Work [employer] Daughter [phone]        Al famoTIDine 20 MG Tabs  Commonly known as:  PEPCID  Start taking on:  8/4/2018  Next dose due: Tomorrow morning      Take 1 tablet (20 mg total) by mouth daily.    Ramona Vogt MD         furosemide 40 MG Tabs  Commonly known as:  LASIX  Next dose due: Commonly known as:  ALDACTONE  Next dose due: Tomorrow morning      Take 25 mg by mouth daily. Warfarin Sodium 4 MG Tabs  Commonly known as:  COUMADIN  Next dose due: Tonight at bedtime      Take 1 tablet (4 mg total) by mouth nightly.    CIERRA ZAMAN [id number] gabapentin (NEURONTIN) cap 300 mg 08/03/18 0806 Given      [id number] gabapentin (NEURONTIN) cap 300 mg 08/03/18 1525 Given      [id number] spironolactone (ALDACTONE) tab 25 mg 08/02/18 2100 Given                    Recent Vital Signs    Flowsheet Ordering provider:  Cheryl Reddy MD  08/02/18 2301 Resulting lab:  [facility]    Specimen Information    Type Source Collected On   Blood — 08/03/18 0607          Components    Component Value Reference Range Flag Lab   Ammonia 38.0 19.5 - 64.6 ug/ Type Source Collected On   Blood — 08/03/18 0607          Components    Component Value Reference Range Flag Lab   PT 23.9 11.8 - 14.5 seconds [facility]   Comment:           Elevations of the PT and/or INR in patients not  receiving anticoagulant ther H&P - H&P Note      H&P signed by Jeyson Mccollum MD at 8/1/2018 12:13 PM   Version 1 of 1    Author:  Jeyson Mccollum MD Service:  Hospitalist Author Type:  Physician    Filed:  8/1/2018 12:13 PM Status:  Signed    :  Jeyson Mccollum MD (Physi diastolic heart failure. The patient also was diagnosed with obstructive sleep apnea but she refused to wear a CPAP machine. PAST SURGICAL HISTORY:  None. MEDICATIONS:  Please see medication reconciliation.   She is on sotalol, digoxin, and recently PLAN:  The patient will be admitted to telemetry floor. Neuro checks q.4 h. We will obtain MRI scan of the brain. CT angiogram of the brain will be done in the emergency room, rule out cerebrovascular accident. Also, obtain ABGs.   Follow up on her leve \"like I was drunk. \"  She still feels that way to some degree. No focal numbness or weakness but does have generalized weakness. She feels that her slurred speech has improved.   Nothing like this has happened in the past.    She does have a long-standin Mental Status: alert, speech fluent and appropriate, follows 2-step commands       Cranial Nerves: pupils equal, round, and reactive to light; extraocular movements full the patient has rotary nystagmus, rate bleeding with counter clockwise component to ri    pressure: 35mm Hg (S). 5. Pericardium, extracardiac: A small pericardial effusion was identified. ASSESSMENT AND PLAN:[AS.1]   Dysarthria, ataxia[AS. 3]  More likely systemic etiology, consider[AS.4] side effect of phenytoin[AS.3]; less likely acute Date of Discharge: 08/03/18  No discharge date for patient encounter. Hospital Discharge Diagnoses: dysarthria    Lace+ Score: 38  59-90 High Risk  29-58 Medium Risk  0-28   Low Risk. TCM Follow-Up Recommendation:  LACE 29-58:  Moderate Risk of readmi Lab Results  Component Value Date   INR 2.2 (H) 08/03/2018   INR 2.8 (H) 08/02/2018   INR 3.0 (H) 08/01/2018[RZ. 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     –Continue phenytoin at reduce arteries patent without significant stenosis. No aneurysm. 3. Enlarged main pulmonary artery compatible with pulmonary hypertension. 4. Differential lung attenuation suggesting air trapping.   This report was called to Dr. Gurmeet Ortega at 2:26 PM on 7/31/2018 Generic drug:  carBAMazepine      Take 200 mg by mouth 2 (two) times daily. Refills:  0     furosemide 40 MG Tabs  Commonly known as:  LASIX      Take 40 mg by mouth 2 (two) times daily.    Refills:  0     gabapentin 300 MG Caps  Commonly known as:  NEURO >30 MIN SPENT ON THIS DC   CIERRA WILLARD  8/3/2018  12:53 PM[RZ.1]                        Electronically signed by Shelia Medina MD on 8/3/2018 12:59 PM   Attribution Key    RZ. 1 - Shelia Medina MD on 8/3/2018 12:53 PM  RZ. 2 - Nikolay Garnica for evaluation. CBC and chemistry are unremarkable. ABG still pending. INR is 3.4. EKG showed paced rhythm with right bundle branch block. The patient had CT scan and chest x-ray which were unremarkable for acute findings.   Phenobarbital, carbamazepin CONCLUSION: No acute disease.       Dictated by (CST): Georgia Haro MD on 7/31/2018 at 13:24     Approved by (CST): Renita Alexis MD on 7/31/2018 at 13:25          Ct Stroke Brain (no Iv)(cpt=70450)    Result Date: 7/31/2018  CONCLUSION What changed: You were already taking a medication with the same name, and this prescription was added. Make sure you understand how and when to take each. Take 1 capsule (200 mg total) by mouth every evening.    Refills:  0     Phenytoin Sodium Exten nitroGLYCERIN 0.2 MG/HR Pt24  Commonly known as:  NITRODUR      Place 1 patch onto the skin daily. Remove at nighttime   Refills:  0     Sotalol HCl 80 MG Tabs  Commonly known as:  BETAPACE      Take 80 mg by mouth daily.    Refills:  0     spironolactone 2 Physician Order: PT Eval and Treat    Presenting Problem: AMS  Reason for Therapy: Mobility Dysfunction and Discharge Planning    PHYSICAL THERAPY ASSESSMENT     Patient is a 68year old female admitted 7/31/2018 for AMS.   Patient's current functional defi training;Stair training;Transfer training;Balance training;Strengthening  Rehab Potential : Good  Frequency (Obs): 5x/week       PHYSICAL THERAPY MEDICAL/SOCIAL HISTORY   Problem List  Principal Problem:    Altered mental status, unspecified altered mental How much difficulty does the patient currently have. ..  -   Turning over in bed (including adjusting bedclothes, sheets and blankets)?: None   -   Sitting down on and standing up from a chair with arms (e.g., wheelchair, bedside commode, etc.): A Little Goal #4 Patient will negotiate 6 stairs/one curb w/ assistive device and supervision   Goal #4   Current Status    Goal #5 Patient to demonstrate independence with home activity/exercise instructions provided to patient in preparation for discharge.    Goal cognitive screening tool; pt received a score of 2/5 which indicates need for continued assessment. Pt received score of 2/3 for word recall.  Received a score of 0/2 for clock drawing; pt demonstrated difficulty with executive functions such as problem sol -   Toileting, which includes using toilet, bedpan or urinal? : A Little  -   Putting on and taking off regular upper body clothing?: A Little  -   Taking care of personal grooming such as brushing teeth?: A Little  -   Eating meals?: [address] 2]    AM-PAC Filed:  8/3/2018  2:22 PM Date of Service:  8/3/2018 11:05 AM Status:  Addendum    :  Demar Bueno OT (Occupational Therapist)    Related Notes:  Addendum by Demar Bueno OT (Occupational Therapist) filed at 8/3/2018  2:22 PM not demonstrate the physical (pt unsteady with dynamic standing balance activities) or cognitive skills (continues to demonstrate decreased problem solving which impacts pt's physical safety) to return to previous living environment and would likely benefi Dynamic Standing: CGA    FUNCTIONAL ADL ASSESSMENT  Grooming: CGA for standing balance  Toileting: CGA (ind for marlo care; CGA for standing balance and safety during don/doff of depends)    Education Provided: role of OT, activity promotion[KH.1]  Patient to complete marlo care, slightly unsteady. Pt began to take depends off in standing requiring CGA for balance and verbal cues for safety. Pt sat on toilet and donned depends with sit and then stand provided SBA.  Pt completed dynamic reaching with CGA for ha Location: L knee[KH. 2]        ACTIVITY TOLERANCE  VSS  Room air  No SOB    ACTIVITIES OF DAILY LIVING ASSESSMENT  AM-PAC ‘6-Clicks’ Inpatient Daily Activity Short Form  How much help from another person does the patient currently need…[KH.1]  -   Putting o Occupational Therapy Note signed by Jr Lozada OT at 8/3/2018  2:21 PM  Version 2 of 2    Author:  Jr Lozada OT Service:  (none) Author Type:  Occupational Therapist    Filed:  8/3/2018  2:21 PM Date of Service:  8/2/2018  4:06 OT Discharge Recommendations: Sub-acute rehabilitation;24 hour care/supervision[KH.2]        PLAN[KH.1]  OT Treatment Plan: Balance activities; ADL training;Energy conservation/work simplification techniques; Functional transfer training; Endurance training;U Patient will complete functional transfer with SPV  Comment:[KH.1] SBA[KH.3]    Patient will complete LE dressing with SBA  Comment:[KH.1] SBA MET[KH.3]    Patient will tolerate standing for 3 minutes in prep for adls with SBA   OCPOMWA:[UZ.6] progress[KH completed functional mobility in hallway x 50 ft with CGA. Upon return to room, completed dynamic standing activity with CGA--opening closet door and reaching for various items. No noted LOB.  Pt is oriented to date and hospital but not to name of hospital. Sit to Stand:  Other (Comment) (SBA)[KH.2]  Bed mobility: SPV  Sit<>stand: SBA  Functional mobility: CGA    BALANCE ASSESSMENT  Static Sitting: SPV  Dynamic Sitting: SBA  Static Standing: SBA  Dynamic Standing: CGA    FUNCTIONAL ADL ASSESSMENT  Lower Extrem supine in bed and agreeable to evaluation which was completed in collaboration with physical therapy. Pt alert --oriented to being in hospital but not to name; oriented to date and month but not to year.  Pt appears to have memory deficits --repeating infor conservation/work simplification techniques; Functional transfer training; Endurance training;UE strengthening/ROM; Compensatory technique education       OCCUPATIONAL THERAPY MEDICAL/SOCIAL HISTORY     Problem List  Principal Problem:    Altered mental statu Following Commands:  follows one step commands with increased time  Motor Planning: intact  Awareness of Deficits:  decreased awareness of deficits    VISION  Pt reports cataracts in L eye --pt with droopy eye lid     PERCEPTION  Appears intact --will cont Education Provided: role of OT, activity promotion   Patient End of Session: Up in chair;Call light within reach; Needs met; Alarm set;RN aware of session/findings    OT Goals  Patients self stated goal is: return home     Patient will complete functional tr

## (undated) NOTE — IP AVS SNAPSHOT
[address]  [address] [facility] [phone]                Discharge Summary   3/22/2017    Mono Polk           Admission Information        Provider Department    3/22/2017 Norris Morales MD [facility] 5w Next dose due:  3-23-17 9PM          Take 300 mg by mouth 3 (three) times daily.                                   Levothyroxine Sodium 88 MCG Tabs   Last time this was given:  88 mcg on 3/23/2017  4:28 AM   Commonly known as:  SYNTHROID, LEVOTHROID   Next Discharge References/Attachments     HEART FAILURE, DISCHARGE INSTRUCTIONS FOR (ENGLISH)    ATRIAL FIBRILLATION, DISCHARGE INSTRUCTIONS FOR (ENGLISH)      Follow-up Information     Follow up with Tang Bermudez In 1 week.     Specialty: RAINBOW DRAW DARK GREEN Collected (03/22/17 9353)      Radiology Exams     None         Additional Information       We are concerned for your overall well being:    - If you are a smoker or have smoked in the last 12 months, we encourage you to explore o prescribed to take and their potential SIDE EFFECTS. Your nurse will review your medications with you before you are discharged, and can provide you with additional printed information.  Not all patients will experience these side effects or respond to [facility] generally include: diarrhea, constipation, headache, allergic reaction (itching, rash, hives)   What to report to your healthcare team: Pain, nausea/vomiting, no bowel movement in 2+ days, diarrhea           Respiratory Medications     Montelukast Sodium 1

## (undated) NOTE — LETTER
Date & Time: 1/10/2022, 9:49 PM  Patient:  [facility]  Encounter Provider(s):    MD Adriana Gallardo MD         This certifies that Tree Lu, a patient at an [facility], am leaving the facility vo

## (undated) NOTE — IP AVS SNAPSHOT
[facility]            (For Outpatient Use Only) Initial Admit Date: 7/31/2018   Inpt/Obs Admit Date: Inpt: 7/31/18 / Obs: N/A   Discharge Date:    Carylon Kawasaki:  [de-identified]   MRN: [de-identified]   CSN: [id number]        [facility] Subscriber ID:  Pt Rel to Subscriber:    Hospital Account Financial Class: Medicare    August 3, 2018